# Patient Record
Sex: FEMALE | Race: WHITE | Employment: OTHER | URBAN - METROPOLITAN AREA
[De-identification: names, ages, dates, MRNs, and addresses within clinical notes are randomized per-mention and may not be internally consistent; named-entity substitution may affect disease eponyms.]

---

## 2017-03-20 ENCOUNTER — OFFICE VISIT (OUTPATIENT)
Dept: INTERNAL MEDICINE CLINIC | Age: 62
End: 2017-03-20

## 2017-03-20 VITALS
OXYGEN SATURATION: 98 % | BODY MASS INDEX: 26.15 KG/M2 | SYSTOLIC BLOOD PRESSURE: 122 MMHG | RESPIRATION RATE: 16 BRPM | HEIGHT: 60 IN | TEMPERATURE: 97.1 F | DIASTOLIC BLOOD PRESSURE: 80 MMHG | WEIGHT: 133.2 LBS | HEART RATE: 77 BPM

## 2017-03-20 DIAGNOSIS — Z12.39 BREAST CANCER SCREENING: ICD-10-CM

## 2017-03-20 DIAGNOSIS — J30.9 ALLERGIC RHINITIS, UNSPECIFIED ALLERGIC RHINITIS TRIGGER, UNSPECIFIED RHINITIS SEASONALITY: ICD-10-CM

## 2017-03-20 DIAGNOSIS — I10 BENIGN HYPERTENSION: Primary | ICD-10-CM

## 2017-03-20 DIAGNOSIS — E78.2 MIXED HYPERLIPIDEMIA: ICD-10-CM

## 2017-03-20 DIAGNOSIS — Z79.899 ENCOUNTER FOR LONG-TERM (CURRENT) DRUG USE: ICD-10-CM

## 2017-03-20 DIAGNOSIS — F32.A DEPRESSION, UNSPECIFIED DEPRESSION TYPE: ICD-10-CM

## 2017-03-20 PROBLEM — Z71.89 ADVANCE DIRECTIVE DISCUSSED WITH PATIENT: Status: ACTIVE | Noted: 2017-03-20

## 2017-03-20 NOTE — PROGRESS NOTES
Subjective:      Pedro Pablo Augustine is a 63 y.o. female who presents today for follow up of her hypertension, hyperlipidemia and depression. She has a very stressful job. She is going to retire in about 2 1/2 years. She is thinking of increasing her celexa from 10mg daily to 20mg daily. She walks a little daily. She denies any chest pain, shortness of breath, syncope, headaches, nausea/vomiting, or any bowel changes. She has increased drainage into her ears , left greater than right, mostly in the evening. Patient Active Problem List   Diagnosis Code    Essential hypertension I10    Migraine G43.909    Herpes zoster B02.9    S/P MARIKA-BSO Z90.710, Z90.722, Z90.79    Anxiety F41.9    Unspecified vitamin D deficiency E55.9    Hx of screening mammography Z92.89    Pap smear for cervical cancer screening Z12.4    Colon cancer screening Z12.11    Hyperlipidemia E78.5    Environmental allergies Z91.09     Current Outpatient Prescriptions   Medication Sig Dispense Refill    citalopram (CELEXA) 10 mg tablet Take 1 Tab by mouth daily. 90 Tab 1    rosuvastatin (CRESTOR) 5 mg tablet Take 1 Tab by mouth every other day. 45 Tab 1    valsartan (DIOVAN) 80 mg tablet Take 1 Tab by mouth daily. 90 Tab 1    estradiol (ESTRACE) 2 mg tablet Take 1 Tab by mouth every other day. 90 Tab 0    cyanocobalamin (VITAMIN B-12) 1,000 mcg tablet Take 1,000 mcg by mouth daily.  fluticasone (FLONASE) 50 mcg/actuation nasal spray 2 Sprays by Both Nostrils route daily. 3 Bottle 1    ascorbic acid (VITAMIN C) 500 mg tablet Take 500 mg by mouth daily.  vitamin c-vitamin e (CRANBERRY CONCENTRATE) cap Take 1 Tab by mouth daily.  lysine (L-LYSINE) 500 mg tab tablet Take 500 mg by mouth daily.  CETIRIZINE HCL (ZYRTEC PO) Take 10 mg by mouth daily.  cholecalciferol, vitamin d3, (VITAMIN D) 1,000 unit tablet Take 1,000 Units by mouth daily.  ASPIRIN 81 mg Tab Take 81 mg by mouth. Review of Systems    Pertinent items are noted in HPI. Objective: Wt Readings from Last 3 Encounters:   03/20/17 133 lb 3.2 oz (60.4 kg)   09/21/16 132 lb (59.9 kg)   05/19/16 131 lb 12.8 oz (59.8 kg)     Temp Readings from Last 3 Encounters:   03/20/17 97.1 °F (36.2 °C) (Oral)   09/21/16 97.8 °F (36.6 °C) (Oral)   05/19/16 97.8 °F (36.6 °C) (Oral)     BP Readings from Last 3 Encounters:   03/20/17 122/80   09/21/16 100/58   05/19/16 110/70     Pulse Readings from Last 3 Encounters:   03/20/17 77   09/21/16 74   05/19/16 60      Visit Vitals    /80 (BP 1 Location: Right arm, BP Patient Position: Sitting)    Pulse 77    Temp 97.1 °F (36.2 °C) (Oral)    Resp 16    Ht 5' (1.524 m)    Wt 133 lb 3.2 oz (60.4 kg)    SpO2 98%    BMI 26.01 kg/m2     General appearance: alert, cooperative, no distress, appears stated age  Head: Normocephalic, without obvious abnormality, atraumatic  Neck: supple, symmetrical, trachea midline, no adenopathy, no carotid bruit and no JVD  Lungs: clear to auscultation bilaterally  Heart: regular rate and rhythm, S1, S2 normal, no murmur, click, rub or gallop  Extremities: extremities normal, atraumatic, no cyanosis or edema  Pulses: 2+ and symmetric  Neurologic: Mental status: Alert, oriented, thought content appropriate, affect: normal    Assessment/Plan:     1. Benign hypertension  -I evaluated and recommended to continue current doses of medications. 2. Mixed hyperlipidemia  -compliant with use of crestor.  -last fasting lipid panel done 9/2016 and controlled     3. Depression, unspecified depression type  -has very stressful days at work. Will continue with celexa 10mg daily. I encouraged her to increase her exercise. 4. Allergic rhinitis, unspecified allergic rhinitis trigger, unspecified rhinitis seasonality  -recommended she use flonase daily in addition to her zyrtec    5. Encounter for long-term (current) drug use      6.  Breast cancer screening  -due for her screening mammogram.     - [facility] MAMMO BI SCREENING INCL CAD; Future     Orders Placed This Encounter    MIGUE MAMMO BI SCREENING INCL CAD     Standing Status:   Future     Standing Expiration Date:   9/18/2017     Order Specific Question:   Reason for Exam     Answer:   screening mammogram      Follow-up Disposition:     Follow up in 6 months     Return if symptoms worsen or fail to improve. Advised patient to call back or return to office if symptoms worsen/change/persist.     Discussed expected course/resolution/complications of diagnosis in detail with patient. Medication risks/benefits/costs/interactions/alternatives discussed with patient. Patient was given an after visit summary which includes diagnoses, current medications, & vitals. Patient expressed understanding with the diagnosis and plan.

## 2017-03-20 NOTE — MR AVS SNAPSHOT
Visit Information Date & Time Provider Department Dept. Phone Encounter #  
 3/20/2017  9:20 AM Aurelia Giraldo MD [place] [address] [facility] [phone] [id number] Follow-up Instructions Return in about 6 months (around 9/20/2017). Upcoming Health Maintenance Date Due  
 BREAST CANCER SCRN MAMMOGRAM 11/6/2017 DTaP/Tdap/Td series (2 - Td) 9/16/2025 COLONOSCOPY 4/29/2026 Allergies as of 3/20/2017  Review Complete On: 3/20/2017 By: Aurelia Giraldo MD  
  
 Severity Noted Reaction Type Reactions Ace Inhibitors  12/28/2011   Side Effect Cough Demerol [Meperidine]  02/19/2009    Nausea and Vomiting Lipitor [Atorvastatin]  08/13/2014   Side Effect Myalgia Monopril [Fosinopril]  02/19/2009    Other (comments)  
 fatigue Current Immunizations  Reviewed on 9/21/2016 Name Date Influenza Vaccine 10/28/2015, 10/1/2014, 10/23/2013 Influenza Vaccine (Quad) PF 9/21/2016 10:20 AM  
 Tdap 9/16/2015 Not reviewed this visit You Were Diagnosed With   
  
 Codes Comments Benign hypertension    -  Primary ICD-10-CM: I10 
ICD-9-CM: 401.1 Mixed hyperlipidemia     ICD-10-CM: E78.2 ICD-9-CM: 272.2 Depression, unspecified depression type     ICD-10-CM: F32.9 ICD-9-CM: 387 Allergic rhinitis, unspecified allergic rhinitis trigger, unspecified rhinitis seasonality     ICD-10-CM: J30.9 ICD-9-CM: 477.9 Encounter for long-term (current) drug use     ICD-10-CM: Z79.899 ICD-9-CM: V58.69 Vitals BP Pulse Temp Resp Height(growth percentile) Weight(growth percentile) 122/80 (BP 1 Location: Right arm, BP Patient Position: Sitting) 77 97.1 °F (36.2 °C) (Oral) 16 5' (1.524 m) 133 lb 3.2 oz (60.4 kg) SpO2 BMI OB Status Smoking Status 98% 26.01 kg/m2 Hysterectomy Never Smoker BMI and BSA Data Body Mass Index Body Surface Area 26.01 kg/m 2 1.6 m 2 Preferred Pharmacy Pharmacy Name Phone STACEY POSADAS [facility] [address] [place] [phone] Your Updated Medication List  
  
   
This list is accurate as of: 3/20/17  9:54 AM.  Always use your most recent med list.  
  
  
  
  
 aspirin 81 mg tablet Take 81 mg by mouth.  
  
 citalopram 10 mg tablet Commonly known as:  Delona Nestle Take 1 Tab by mouth daily. CRANBERRY CONCENTRATE Cap Generic drug:  vitamin c-vitamin e Take 1 Tab by mouth daily. estradiol 2 mg tablet Commonly known as:  ESTRACE Take 1 Tab by mouth every other day. fluticasone 50 mcg/actuation nasal spray Commonly known as:  Cathlyn Christian 2 Sprays by Both Nostrils route daily. lysine 500 mg Tab tablet Commonly known as:  L-LYSINE Take 500 mg by mouth daily. rosuvastatin 5 mg tablet Commonly known as:  CRESTOR Take 1 Tab by mouth every other day. valsartan 80 mg tablet Commonly known as:  DIOVAN Take 1 Tab by mouth daily. VITAMIN B-12 1,000 mcg tablet Generic drug:  cyanocobalamin Take 1,000 mcg by mouth daily. VITAMIN C 500 mg tablet Generic drug:  ascorbic acid (vitamin C) Take 500 mg by mouth daily. VITAMIN D3 1,000 unit tablet Generic drug:  cholecalciferol Take 1,000 Units by mouth daily. ZYRTEC PO Take 10 mg by mouth daily. Follow-up Instructions Return in about 6 months (around 9/20/2017). Introducing [facility] & HEALTH SERVICES! Dear Paty Hancock: Thank you for requesting a [employer] account. Our records indicate that you already have an active [employer] account. You can access your account anytime at [email or web address] Did you know that you can access your hospital and ER discharge instructions at any time in [employer]? You can also review all of your test results from your hospital stay or ER visit. Additional Information If you have questions, please visit the Frequently Asked Questions section of the [employer] website at [email or web address] Remember, [employer] is NOT to be used for urgent needs. For medical emergencies, dial 911. Now available from your iPhone and Android! Please provide this summary of care documentation to your next provider. Your primary care clinician is listed as Ivon Armendariz. If you have any questions after today's visit, please call [phone].

## 2017-04-10 RX ORDER — CITALOPRAM 10 MG/1
10 TABLET ORAL DAILY
Qty: 90 TAB | Refills: 1 | Status: SHIPPED | OUTPATIENT
Start: 2017-04-10 | End: 2017-10-08 | Stop reason: SDUPTHER

## 2017-04-10 RX ORDER — VALSARTAN 80 MG/1
80 TABLET ORAL DAILY
Qty: 90 TAB | Refills: 1 | Status: SHIPPED | OUTPATIENT
Start: 2017-04-10 | End: 2017-10-08 | Stop reason: SDUPTHER

## 2017-04-10 NOTE — TELEPHONE ENCOUNTER
From: Esther Dimas  To: JASEN Lanier  Sent: 4/10/2017 8:37 AM EDT  Subject: Medication Renewal Request    Original authorizing provider: JASEN Lanier would like a refill of the following medications:  citalopram (CELEXA) 10 mg tablet [JASEN Mar]  valsartan (DIOVAN) 80 mg tablet JASEN Lanier]    Preferred pharmacy: [facility] [place] [address] [address]     Comment:

## 2017-04-10 NOTE — TELEPHONE ENCOUNTER
Last office visit - 03.20.17  Next office visit - 09.25.17  Last Refill - 10.14.16    Requested Prescriptions     Pending Prescriptions Disp Refills    citalopram (CELEXA) 10 mg tablet 90 Tab 1     Sig: Take 1 Tab by mouth daily.  valsartan (DIOVAN) 80 mg tablet 90 Tab 1     Sig: Take 1 Tab by mouth daily.

## 2017-05-11 RX ORDER — ESTRADIOL 2 MG/1
2 TABLET ORAL EVERY OTHER DAY
Qty: 90 TAB | Refills: 1 | Status: SHIPPED | OUTPATIENT
Start: 2017-05-11 | End: 2018-09-04 | Stop reason: SDUPTHER

## 2017-05-11 RX ORDER — ROSUVASTATIN CALCIUM 5 MG/1
5 TABLET, COATED ORAL EVERY OTHER DAY
Qty: 45 TAB | Refills: 1 | Status: SHIPPED | OUTPATIENT
Start: 2017-05-11 | End: 2018-01-05 | Stop reason: SDUPTHER

## 2017-05-11 NOTE — TELEPHONE ENCOUNTER
From: Gregory Banks  To: Maicol Zhou MD  Sent: 5/11/2017 9:00 AM EDT  Subject: Medication Renewal Request    Original authorizing provider: MD Gregory Ivy would like a refill of the following medications:  estradiol (ESTRACE) 2 mg tablet Katina Valerio MD]    [facility] [address] [place] [address], [facility] [address].     Comment:

## 2017-05-11 NOTE — TELEPHONE ENCOUNTER
Last office visit - 03.20.17  Next office visit - 09.25.17    Requested Prescriptions     Pending Prescriptions Disp Refills    estradiol (ESTRACE) 2 mg tablet 90 Tab 0     Sig: Take 1 Tab by mouth every other day.  rosuvastatin (CRESTOR) 5 mg tablet 45 Tab 1     Sig: Take 1 Tab by mouth every other day.

## 2017-09-06 ENCOUNTER — HOSPITAL ENCOUNTER (OUTPATIENT)
Dept: MAMMOGRAPHY | Age: 62
Discharge: HOME | End: 2017-09-06
Attending: INTERNAL MEDICINE
Payer: COMMERCIAL

## 2017-09-06 DIAGNOSIS — Z12.31 VISIT FOR SCREENING MAMMOGRAM: ICD-10-CM

## 2017-09-06 PROCEDURE — 77063 BREAST TOMOSYNTHESIS BI: CPT

## 2017-09-25 ENCOUNTER — OFFICE VISIT (OUTPATIENT)
Dept: INTERNAL MEDICINE CLINIC | Age: 62
End: 2017-09-25

## 2017-09-25 VITALS
SYSTOLIC BLOOD PRESSURE: 118 MMHG | HEIGHT: 60 IN | DIASTOLIC BLOOD PRESSURE: 78 MMHG | BODY MASS INDEX: 25.52 KG/M2 | OXYGEN SATURATION: 97 % | RESPIRATION RATE: 16 BRPM | WEIGHT: 130 LBS | HEART RATE: 94 BPM | TEMPERATURE: 98.4 F

## 2017-09-25 DIAGNOSIS — Z00.00 ROUTINE GENERAL MEDICAL EXAMINATION AT A HEALTH CARE FACILITY: Primary | ICD-10-CM

## 2017-09-25 DIAGNOSIS — Z23 ENCOUNTER FOR IMMUNIZATION: ICD-10-CM

## 2017-09-25 DIAGNOSIS — F32.A DEPRESSION, UNSPECIFIED DEPRESSION TYPE: ICD-10-CM

## 2017-09-25 DIAGNOSIS — H91.93 BILATERAL HEARING LOSS, UNSPECIFIED HEARING LOSS TYPE: ICD-10-CM

## 2017-09-25 DIAGNOSIS — Z79.899 ENCOUNTER FOR LONG-TERM (CURRENT) USE OF MEDICATIONS: ICD-10-CM

## 2017-09-25 DIAGNOSIS — I10 BENIGN HYPERTENSION: ICD-10-CM

## 2017-09-25 DIAGNOSIS — E78.2 MIXED HYPERLIPIDEMIA: ICD-10-CM

## 2017-09-25 NOTE — MR AVS SNAPSHOT
Visit Information Date & Time Provider Department Dept. Phone Encounter #  
 9/25/2017 10:00 AM Kaela Bowser MD [place] [address] [facility] [phone] Follow-up Instructions Return in about 6 months (around 3/25/2018) for hypertension, hyperlipidemia, depression. Upcoming Health Maintenance Date Due INFLUENZA AGE 9 TO ADULT 8/1/2017 ZOSTER VACCINE AGE 60> 11/4/2017* BREAST CANCER SCRN MAMMOGRAM 9/6/2019 DTaP/Tdap/Td series (2 - Td) 9/16/2025 COLONOSCOPY 4/29/2026 *Topic was postponed. The date shown is not the original due date. Allergies as of 9/25/2017  Review Complete On: 9/25/2017 By: Kaela Bowser MD  
  
 Severity Noted Reaction Type Reactions Ace Inhibitors  12/28/2011   Side Effect Cough Demerol [Meperidine]  02/19/2009    Nausea and Vomiting Lipitor [Atorvastatin]  08/13/2014   Side Effect Myalgia Monopril [Fosinopril]  02/19/2009    Other (comments)  
 fatigue Current Immunizations  Reviewed on 9/21/2016 Name Date Influenza Vaccine 10/28/2015, 10/1/2014, 10/23/2013 Influenza Vaccine (Quad) PF  Incomplete, 9/21/2016 10:20 AM  
 Tdap 9/16/2015 Not reviewed this visit You Were Diagnosed With   
  
 Codes Comments Routine general medical examination at a health care facility    -  Primary ICD-10-CM: Z00.00 ICD-9-CM: V70.0 Encounter for immunization     ICD-10-CM: U86 ICD-9-CM: V03.89 Bilateral hearing loss, unspecified hearing loss type     ICD-10-CM: H91.93 
ICD-9-CM: 389.9 Mixed hyperlipidemia     ICD-10-CM: E78.2 ICD-9-CM: 272.2 Benign hypertension     ICD-10-CM: I10 
ICD-9-CM: 401.1 Depression, unspecified depression type     ICD-10-CM: F32.9 ICD-9-CM: 117 Encounter for long-term (current) use of medications     ICD-10-CM: Z79.899 ICD-9-CM: V58.69 Vitals BP Pulse Temp Resp Height(growth percentile) Weight(growth percentile) 118/78 (BP 1 Location: Left arm, BP Patient Position: Sitting) 94 98.4 °F (36.9 °C) (Oral) 16 5' (1.524 m) 130 lb (59 kg) SpO2 BMI OB Status Smoking Status 97% 25.39 kg/m2 Hysterectomy Never Smoker Vitals History BMI and BSA Data Body Mass Index Body Surface Area  
 25.39 kg/m 2 1.58 m 2 Preferred Pharmacy Pharmacy Name Phone [facility]  [address] [place] [address] [phone] Your Updated Medication List  
  
   
This list is accurate as of: 9/25/17 10:49 AM.  Always use your most recent med list.  
  
  
  
  
 aspirin 81 mg tablet Take 81 mg by mouth.  
  
 citalopram 10 mg tablet Commonly known as:  Radha Sink Take 1 Tab by mouth daily. CRANBERRY CONCENTRATE Cap Generic drug:  vitamin c-vitamin e Take 1 Tab by mouth daily. estradiol 2 mg tablet Commonly known as:  ESTRACE Take 1 Tab by mouth every other day. fluticasone 50 mcg/actuation nasal spray Commonly known as:  [employer] 2 Sprays by Both Nostrils route daily. lysine 500 mg Tab tablet Commonly known as:  L-LYSINE Take 500 mg by mouth daily. rosuvastatin 5 mg tablet Commonly known as:  CRESTOR Take 1 Tab by mouth every other day. valsartan 80 mg tablet Commonly known as:  DIOVAN Take 1 Tab by mouth daily. VITAMIN A DAY PO Take  by mouth. VITAMIN B-12 1,000 mcg tablet Generic drug:  cyanocobalamin Take 1,000 mcg by mouth daily. VITAMIN C 500 mg tablet Generic drug:  ascorbic acid (vitamin C) Take 500 mg by mouth daily. VITAMIN D3 1,000 unit tablet Generic drug:  cholecalciferol Take 1,000 Units by mouth daily. ZYRTEC PO Take 10 mg by mouth daily. We Performed the Following AMB POC EKG ROUTINE W/ 12 LEADS, INTER & REP [49086 CPT(R)] CBC WITH AUTOMATED DIFF [07978 CPT(R)] INFLUENZA VIRUS VAC QUAD,SPLIT,PRESV FREE SYRINGE IM [id number] CPT(R)] LIPID PANEL [89419 CPT(R)] METABOLIC PANEL, COMPREHENSIVE [54290 CPT(R)] OH IMMUNIZ ADMIN,1 SINGLE/COMB VAC/TOXOID [id number] CPT(R)] REFERRAL TO ENT-OTOLARYNGOLOGY [DUQ42 Custom] Comments:  
 Please evaluate patient for hearing testing. URINALYSIS W/ RFLX MICROSCOPIC [90987 CPT(R)] VITAMIN D, 25 HYDROXY [id number] CPT(R)] Follow-up Instructions Return in about 6 months (around 3/25/2018) for hypertension, hyperlipidemia, depression. Referral Information Referral ID Referred By Referred To  
  
 [id number] Williams SOLANO MD [facility] [address] [place] [address], [address] [place] [address] Phone: [phone] Fax: [phone] Visits Status Start Date End Date 1 New Request 9/25/17 9/25/18 If your referral has a status of pending review or denied, additional information will be sent to support the outcome of this decision. Introducing [facility] & HEALTH SERVICES! Dear Angel Childress: Thank you for requesting a [employer] account. Our records indicate that you already have an active [employer] account. You can access your account anytime at [email or web address] Did you know that you can access your hospital and ER discharge instructions at any time in [employer]? You can also review all of your test results from your hospital stay or ER visit. Additional Information If you have questions, please visit the Frequently Asked Questions section of the [employer] website at [email or web address] Remember, [employer] is NOT to be used for urgent needs. For medical emergencies, dial 911. Now available from your iPhone and Android! Please provide this summary of care documentation to your next provider. Your primary care clinician is listed as Ivon Solano. If you have any questions after today's visit, please call [phone].

## 2017-09-25 NOTE — PROGRESS NOTES
Subjective:      Jillian Toribio is a 63 y.o. female who presents today for her annual physical and follow up of her depression, hypertension and hyperlipidemia. Gyn care - has had marika/bso  Scre[facility] - date- 9/7/17  Screening colonoscopy was last completed 4/29/16- Follow up in 5 years. Exercise -  Nothing regular    Has been diagnosed with bilateral hearing loss in the past, but feels that it is getting worse. Would like to have hearing retested. Her depression is well controlled with current dose of celexa. Patient Active Problem List   Diagnosis Code    Essential hypertension I10    Migraine G43.909    Herpes zoster B02.9    S/P MARIKA-BSO Z90.710, Z90.722, Z90.79    Anxiety F41.9    Unspecified vitamin D deficiency E55.9    Hx of screening mammography Z92.89    Pap smear for cervical cancer screening Z12.4    Colon cancer screening Z12.11    Hyperlipidemia E78.5    Environmental allergies Z91.09    Advance directive discussed with patient Z71.89     Current Outpatient Prescriptions   Medication Sig Dispense Refill    MULTIVITAMIN (VITAMIN A DAY PO) Take  by mouth.  varicella zoster vacine live (ZOSTAVAX) 19,400 unit/0.65 mL susr injection 1 Vial by SubCUTAneous route once as needed for up to 1 dose. 0.65 mL 0    estradiol (ESTRACE) 2 mg tablet Take 1 Tab by mouth every other day. 90 Tab 1    rosuvastatin (CRESTOR) 5 mg tablet Take 1 Tab by mouth every other day. 45 Tab 1    citalopram (CELEXA) 10 mg tablet Take 1 Tab by mouth daily. 90 Tab 1    valsartan (DIOVAN) 80 mg tablet Take 1 Tab by mouth daily. 90 Tab 1    cyanocobalamin (VITAMIN B-12) 1,000 mcg tablet Take 1,000 mcg by mouth daily.  fluticasone (FLONASE) 50 mcg/actuation nasal spray 2 Sprays by Both Nostrils route daily. 3 Bottle 1    ascorbic acid (VITAMIN C) 500 mg tablet Take 500 mg by mouth daily.  vitamin c-vitamin e (CRANBERRY CONCENTRATE) cap Take 1 Tab by mouth daily.       lysine (L-LYSINE) 500 mg tab tablet Take 500 mg by mouth daily.  CETIRIZINE HCL (ZYRTEC PO) Take 10 mg by mouth daily.  cholecalciferol, vitamin d3, (VITAMIN D) 1,000 unit tablet Take 1,000 Units by mouth daily.  ASPIRIN 81 mg Tab Take 81 mg by mouth. Review of Systems    A comprehensive review of systems was negative except for that written in the HPI. Objective:     Visit Vitals    /78 (BP 1 Location: Left arm, BP Patient Position: Sitting)    Pulse 94    Temp 98.4 °F (36.9 °C) (Oral)    Resp 16    Ht 5' (1.524 m)    Wt 130 lb (59 kg)    SpO2 97%    BMI 25.39 kg/m2     General:  Alert, cooperative, no distress, appears stated age. Head:  Normocephalic, without obvious abnormality, atraumatic. Eyes:  Conjunctivae/corneas clear. PERRL, EOMs intact. Ears:  Normal TMs and external ear canals both ears. Nose: Nares normal. Septum midline. Mucosa normal. No drainage or sinus tenderness. Throat: Lips, mucosa, and tongue normal. Teeth and gums normal.   Neck: Supple, symmetrical, trachea midline, no adenopathy, thyroid: no enlargement/tenderness/nodules, no carotid bruit and no JVD. Back:   Symmetric, no curvature. ROM normal. No CVA tenderness. Lungs:   Clear to auscultation bilaterally. Chest wall:  No tenderness or deformity. Heart:  Regular rate and rhythm, S1, S2 normal, no murmur, click, rub or gallop. Breast Exam:  No tenderness, masses, or nipple abnormality. Abdomen:   Soft, non-tender. Bowel sounds normal. No masses,  No organomegaly. Extremities: Extremities normal, atraumatic, no cyanosis or edema. Pulses: 2+ and symmetric all extremities. Skin: Skin color, texture, turgor normal. No rashes or lesions. Lymph nodes: Cervical, supraclavicular, and axillary nodes normal.   Neurologic: CNII-XII intact. Normal strength, sensation and reflexes throughout. Assessment/Plan:     1.  Routine general medical examination at a [facility] care facility  -up to date with screening mammogram  -up to date with screening colonoscopy   -due for shingles vaccine. - script given to patient. - AMB POC EKG ROUTINE W/ 12 LEADS, INTER & REP  - CBC WITH AUTOMATED DIFF  - METABOLIC PANEL, COMPREHENSIVE  - LIPID PANEL  - URINALYSIS W/ RFLX MICROSCOPIC  - VITAMIN D, 25 HYDROXY    2. Encounter for immunization  -received her flu vaccine today without any complications    - Influenza virus vaccine (QUADRIVALENT PRES FREE SYRINGE) IM ([id number])  - MT IMMUNIZ ADMIN,1 SINGLE/COMB VAC/TOXOID    Also, she has additional complaints of the following --.     3. Bilateral hearing loss, unspecified hearing loss type  -referred to [facility] for hearing testing.     - REFERRAL TO ENT-OTOLARYNGOLOGY    4. Mixed hyperlipidemia  -due for fasting lipid panel at this time. 5. Benign hypertension  -I evaluated and recommended to continue current doses of medications. 6. Depression, unspecified depression type  -I evaluated and recommended to continue current doses of medications. 7. Encounter for long-term (current) use of medications    Orders Placed This Encounter    MT IMMUNIZ ADMIN,1 SINGLE/COMB VAC/TOXOID    Influenza virus vaccine (QUADRIVALENT PRES FREE SYRINGE) IM (98229)    CBC WITH AUTOMATED DIFF    METABOLIC PANEL, COMPREHENSIVE    LIPID PANEL    URINALYSIS W/ RFLX MICROSCOPIC    VITAMIN D, 25 HYDROXY    REFERRAL TO ENT-OTOLARYNGOLOGY     Referral Priority:   Routine     Referral Type:   Consultation     Referral Reason:   Specialty Services Required     Referred to Provider:   Manjit Duval MD     Requested Specialty:   Otolaryngology    AMB POC EKG ROUTINE W/ 12 LEADS, INTER & REP     Order Specific Question:   Reason for Exam:     Answer:   complete physical exam    MULTIVITAMIN (VITAMIN A DAY PO)     Sig: Take  by mouth.     varicella zoster vacine live (ZOSTAVAX) 19,400 unit/0.65 mL susr injection     Si Vial by SubCUTAneous route once as needed for up to 1 dose. Dispense:  0.65 mL     Refill:  0         Follow-up Disposition:     Follow up in 6 months     Return if symptoms worsen or fail to improve. Advised patient to call back or return to office if symptoms worsen/change/persist.     Discussed expected course/resolution/complications of diagnosis in detail with patient. Medication risks/benefits/costs/interactions/alternatives discussed with patient. Patient was given an after visit summary which includes diagnoses, current medications, & vitals. Patient expressed understanding with the diagnosis and plan.

## 2017-09-25 NOTE — PROGRESS NOTES
Chief Complaint   Patient presents with    Complete Physical     1. Have you been to the ER, urgent care clinic since your last visit? Hospitalized since your last visit? No    2. Have you seen or consulted any other health care providers outside of the [address] [place] [address] since your last visit? Include any pap smears or colon screening.  No

## 2017-09-26 LAB
25(OH)D3+25(OH)D2 SERPL-MCNC: 53.2 NG/ML (ref 30–100)
ALBUMIN SERPL-MCNC: 4.5 G/DL (ref 3.5–5.5)
ALBUMIN/GLOB SERPL: 1.6 {RATIO} (ref 1.2–2.2)
ALP SERPL-CCNC: 61 IU/L (ref 39–117)
ALT SERPL-CCNC: 16 IU/L (ref 0–32)
APPEARANCE UR: CLEAR
AST SERPL-CCNC: 21 IU/L (ref 0–40)
BASOPHILS # BLD AUTO: 0 X10E3/UL (ref 0–0.2)
BASOPHILS NFR BLD AUTO: 1 %
BILIRUB SERPL-MCNC: 0.4 MG/DL (ref 0–1.2)
BILIRUB UR QL STRIP: NEGATIVE
BUN SERPL-MCNC: 12 MG/DL (ref 6–24)
BUN/CREAT SERPL: 16 (ref 9–23)
CALCIUM SERPL-MCNC: 9 MG/DL (ref 8.7–10.2)
CHLORIDE SERPL-SCNC: 102 MMOL/L (ref 96–106)
CHOLEST SERPL-MCNC: 152 MG/DL (ref 100–199)
CO2 SERPL-SCNC: 25 MMOL/L (ref 18–29)
COLOR UR: YELLOW
CREAT SERPL-MCNC: 0.77 MG/DL (ref 0.57–1)
EOSINOPHIL # BLD AUTO: 0.1 X10E3/UL (ref 0–0.4)
EOSINOPHIL NFR BLD AUTO: 3 %
ERYTHROCYTE [DISTWIDTH] IN BLOOD BY AUTOMATED COUNT: 14.4 % (ref 12.3–15.4)
GLOBULIN SER CALC-MCNC: 2.8 G/DL (ref 1.5–4.5)
GLUCOSE SERPL-MCNC: 86 MG/DL (ref 65–99)
GLUCOSE UR QL: NEGATIVE
HCT VFR BLD AUTO: 36.5 % (ref 34–46.6)
HDLC SERPL-MCNC: 79 MG/DL
HGB BLD-MCNC: 11.9 G/DL (ref 11.1–15.9)
HGB UR QL STRIP: NEGATIVE
IMM GRANULOCYTES # BLD: 0 X10E3/UL (ref 0–0.1)
IMM GRANULOCYTES NFR BLD: 0 %
INTERPRETATION, 910389: NORMAL
KETONES UR QL STRIP: NEGATIVE
LDLC SERPL CALC-MCNC: 57 MG/DL (ref 0–99)
LEUKOCYTE ESTERASE UR QL STRIP: NEGATIVE
LYMPHOCYTES # BLD AUTO: 1.3 X10E3/UL (ref 0.7–3.1)
LYMPHOCYTES NFR BLD AUTO: 41 %
MCH RBC QN AUTO: 30.8 PG (ref 26.6–33)
MCHC RBC AUTO-ENTMCNC: 32.6 G/DL (ref 31.5–35.7)
MCV RBC AUTO: 95 FL (ref 79–97)
MICRO URNS: NORMAL
MONOCYTES # BLD AUTO: 0.5 X10E3/UL (ref 0.1–0.9)
MONOCYTES NFR BLD AUTO: 15 %
NEUTROPHILS # BLD AUTO: 1.3 X10E3/UL (ref 1.4–7)
NEUTROPHILS NFR BLD AUTO: 40 %
NITRITE UR QL STRIP: NEGATIVE
PH UR STRIP: 6 [PH] (ref 5–7.5)
PLATELET # BLD AUTO: 233 X10E3/UL (ref 150–379)
POTASSIUM SERPL-SCNC: 3.6 MMOL/L (ref 3.5–5.2)
PROT SERPL-MCNC: 7.3 G/DL (ref 6–8.5)
PROT UR QL STRIP: NEGATIVE
RBC # BLD AUTO: 3.86 X10E6/UL (ref 3.77–5.28)
SODIUM SERPL-SCNC: 143 MMOL/L (ref 134–144)
SP GR UR: 1.02 (ref 1–1.03)
TRIGL SERPL-MCNC: 80 MG/DL (ref 0–149)
UROBILINOGEN UR STRIP-MCNC: 0.2 MG/DL (ref 0.2–1)
VLDLC SERPL CALC-MCNC: 16 MG/DL (ref 5–40)
WBC # BLD AUTO: 3.2 X10E3/UL (ref 3.4–10.8)

## 2018-01-05 RX ORDER — ROSUVASTATIN CALCIUM 5 MG/1
5 TABLET, COATED ORAL EVERY OTHER DAY
Qty: 45 TAB | Refills: 1 | Status: SHIPPED | OUTPATIENT
Start: 2018-01-05 | End: 2018-09-04 | Stop reason: SDUPTHER

## 2018-01-05 NOTE — TELEPHONE ENCOUNTER
Last office visit- 9/25/17  Next office visit- 3/27/18  Last Refill- 5/11/17    Requested Prescriptions     Pending Prescriptions Disp Refills    rosuvastatin (CRESTOR) 5 mg tablet 45 Tab 1     Sig: Take 1 Tab by mouth every other day.

## 2018-01-05 NOTE — TELEPHONE ENCOUNTER
From: Edward Schlatter  To: Radha Aceves MD  Sent: 1/5/2018 12:07 AM EST  Subject: Medication Renewal Request    Original authorizing provider: MD La Zhou would like a refill of the following medications:  rosuvastatin (CRESTOR) 5 mg tablet Yamilex Naidu MD]    Preferred pharmacy: [facility] [address] [place] [address], [address] [place] [address]. Comment:  Please request refill of Crestor from [address] [place] [address] (current pharmacy). Thanks!

## 2018-03-27 ENCOUNTER — OFFICE VISIT (OUTPATIENT)
Dept: INTERNAL MEDICINE CLINIC | Age: 63
End: 2018-03-27

## 2018-03-27 VITALS
SYSTOLIC BLOOD PRESSURE: 124 MMHG | WEIGHT: 131 LBS | HEIGHT: 60 IN | BODY MASS INDEX: 25.72 KG/M2 | RESPIRATION RATE: 14 BRPM | DIASTOLIC BLOOD PRESSURE: 76 MMHG | TEMPERATURE: 99.6 F | HEART RATE: 98 BPM | OXYGEN SATURATION: 98 %

## 2018-03-27 DIAGNOSIS — E78.2 MIXED HYPERLIPIDEMIA: Primary | ICD-10-CM

## 2018-03-27 DIAGNOSIS — Z79.899 ENCOUNTER FOR LONG-TERM (CURRENT) USE OF MEDICATIONS: ICD-10-CM

## 2018-03-27 DIAGNOSIS — H91.93 BILATERAL HEARING LOSS, UNSPECIFIED HEARING LOSS TYPE: ICD-10-CM

## 2018-03-27 DIAGNOSIS — R42 LIGHTHEADED: ICD-10-CM

## 2018-03-27 DIAGNOSIS — I10 BENIGN HYPERTENSION: ICD-10-CM

## 2018-03-27 DIAGNOSIS — S29.012A STRAIN OF RHOMBOID MUSCLE, INITIAL ENCOUNTER: ICD-10-CM

## 2018-03-27 DIAGNOSIS — F32.A DEPRESSION, UNSPECIFIED DEPRESSION TYPE: ICD-10-CM

## 2018-03-27 RX ORDER — CITALOPRAM 10 MG/1
10 TABLET ORAL DAILY
Qty: 90 TAB | Refills: 1 | Status: SHIPPED | OUTPATIENT
Start: 2018-03-27 | End: 2018-10-05 | Stop reason: SDUPTHER

## 2018-03-27 RX ORDER — VALSARTAN 80 MG/1
80 TABLET ORAL DAILY
Qty: 90 TAB | Refills: 1 | Status: SHIPPED | OUTPATIENT
Start: 2018-03-27 | End: 2018-10-05 | Stop reason: SDUPTHER

## 2018-03-27 NOTE — PROGRESS NOTES
Subjective:      Mel Lancaster is a 63 y.o. female who presents today for follow up of her depression, hypertension  And hyperlipidemia. Has some lightheadedness in the evening. Started about 4 months ago and has gotten worse. She is lightheaded not vertigo. Has headaches in her neck, top of her head at the end of the day as well. Feels like a vice squeezing. She works on a computer all day. No regular exercise. She states that it improves on the weekends, but recurs Monday late afternoon. She is consuming 3-4 soda cans daily. Meclizine otc helps a little. Last eye exam was last year. Due for follow up at this time. She was told in the past that she has bilateral hearing loss. It is genetic. She would like further evaluation with ENT    Blood pressures at home range systolic from 985'C to 675'V. Patient Active Problem List   Diagnosis Code    Essential hypertension I10    Migraine G43.909    Herpes zoster B02.9    S/P MARIKA-BSO Z90.710, Z90.722, Z90.79    Anxiety F41.9    Unspecified vitamin D deficiency E55.9    Hx of screening mammography Z92.89    Pap smear for cervical cancer screening Z12.4    Colon cancer screening Z12.11    Hyperlipidemia E78.5    Environmental allergies Z91.09    Advance directive discussed with patient Z71.89     Current Outpatient Prescriptions   Medication Sig Dispense Refill    varicella-zoster recombinant, PF, (SHINGRIX) 50 mcg/0.5 mL susr injection 0.5 mL by IntraMUSCular route once for 1 dose. Repeat in 2-4 months 0.5 mL 1    citalopram (CELEXA) 10 mg tablet Take 1 Tab by mouth daily. 90 Tab 1    valsartan (DIOVAN) 80 mg tablet Take 1 Tab by mouth daily. 90 Tab 1    rosuvastatin (CRESTOR) 5 mg tablet Take 1 Tab by mouth every other day. 45 Tab 1    estradiol (ESTRACE) 2 mg tablet Take 1 Tab by mouth every other day. 90 Tab 1    cyanocobalamin (VITAMIN B-12) 1,000 mcg tablet Take 1,000 mcg by mouth daily.       ascorbic acid (VITAMIN C) 500 mg tablet Take 500 mg by mouth daily.  vitamin c-vitamin e (CRANBERRY CONCENTRATE) cap Take 1 Tab by mouth daily.  CETIRIZINE HCL (ZYRTEC PO) Take 10 mg by mouth daily.  cholecalciferol, vitamin d3, (VITAMIN D) 1,000 unit tablet Take 2,000 Units by mouth daily.  fluticasone (FLONASE) 50 mcg/actuation nasal spray 2 Sprays by Both Nostrils route daily. 3 Bottle 1    lysine (L-LYSINE) 500 mg tab tablet Take 500 mg by mouth daily. Review of Systems    Pertinent items are noted in HPI. Objective:     Visit Vitals    /76 (BP 1 Location: Left arm, BP Patient Position: Sitting)    Pulse 98    Temp 99.6 °F (37.6 °C) (Oral)    Resp 14    Ht 5' (1.524 m)    Wt 131 lb (59.4 kg)    SpO2 98%    BMI 25.58 kg/m2     General appearance: alert, cooperative, no distress, appears stated age  Head: Normocephalic, without obvious abnormality, atraumatic  Ears: normal TM's and external ear canals AU  Nose: Nares normal. Septum midline. Mucosa dry No drainage or sinus tenderness. Throat: Lips, mucosa, and tongue normal. Teeth and gums normal and normal findings: oropharynx pink & moist without lesions or evidence of thrush  Neck: supple, symmetrical, trachea midline, no adenopathy, no carotid bruit and no JVD  Lungs: clear to auscultation bilaterally  Heart: regular rate and rhythm, S1, S2 normal, no murmur, click, rub or gallop  Spine: no tenderness to palpation along cervical spine. Significant muscle tightness in the upper trapezius region. Full range of motion at neck. Assessment/Plan:     1. Lightheaded  -encouraged reduction of caffeine use  -increase water intake  -will check labs  -recommended she follow up with her eye exams.     - CBC WITH AUTOMATED DIFF  - METABOLIC PANEL, COMPREHENSIVE  - TSH 3RD GENERATION    2. Mixed hyperlipidemia  -last fasting lipid panel done 9/2017 controlled. - METABOLIC PANEL, COMPREHENSIVE    3.  Benign hypertension  -I evaluated and recommended to continue current doses of medications.     - METABOLIC PANEL, COMPREHENSIVE    4. Depression, unspecified depression type  -I evaluated and recommended to continue current doses of medications. -patient requested refill of her celexa today    5. Encounter for long-term (current) use of medications      6. Bilateral hearing loss, unspecified hearing loss type  -referred to [facility] for hearing evaluation    - REFERRAL TO ENT-OTOLARYNGOLOGY    7. Strain of rhomboid muscle, initial encounter  -she works on a computer all day.  -encouraged stretches throughout the day      Follow-up Disposition:     Follow up in 6 months     Return if symptoms worsen or fail to improve. Advised patient to call back or return to office if symptoms worsen/change/persist.     Discussed expected course/resolution/complications of diagnosis in detail with patient. Medication risks/benefits/costs/interactions/alternatives discussed with patient. Patient was given an after visit summary which includes diagnoses, current medications, & vitals. Patient expressed understanding with the diagnosis and plan.

## 2018-03-27 NOTE — PATIENT INSTRUCTIONS
Rhomboid Muscle Strain: Rehab Exercises  Your Care Instructions  Here are some examples of typical rehabilitation exercises for your condition. Start each exercise slowly. Ease off the exercise if you start to have pain. Your doctor or physical therapist will tell you when you can start these exercises and which ones will work best for you. How to do the exercises  Lower neck and upper back (rhomboid) stretch    1. Stretch your arms out in front of your body. Clasp one hand on top of your other hand. 2. Gently reach out so that you feel your shoulder blades stretching away from each other. 3. Gently bend your head forward. 4. Hold for 15 to 30 seconds. 5. Repeat 2 to 4 times. Resisted rows    For this exercise, you will need elastic exercise material, such as surgical tubing or Thera-Band. 1. Put the band around a solid object, such as a bedpost, at about waist level. Stand facing where you have placed the band. Hold equal lengths of the band in each hand. 2. Start with your arms held out in front of you. 3. Pull the bands back, and move your shoulder blades together. As you finish, your elbows should be at your side and bent at 90 degrees (like the angle of the letter \"L\"). 4. Return to the starting position. 5. Repeat 8 to 12 times. Neck stretches    1. Look straight ahead, and tip your right ear to your right shoulder. Do not let your left shoulder rise as you tip your head to the right. 2. Hold for 15 to 30 seconds. 3. Tilt your head to the left. Do not let your right shoulder rise as you tip your head to the left. 4. Hold for 15 to 30 seconds. 5. Repeat 2 to 4 times to each side. Neck rotation    1. Sit in a firm chair, or stand up straight. 2. Keeping your chin level, turn your head to the right, and hold for 15 to 30 seconds. 3. Turn your head to the left, and hold for 15 to 30 seconds. 4. Repeat 2 to 4 times to each side. Follow-up care is a key part of your treatment and safety. Be sure to make and go to all appointments, and call your doctor if you are having problems. It's also a good idea to know your test results and keep a list of the medicines you take. Where can you learn more? Go to http://bennie-eboni.info/. Enter [id number] [id number] in the search box to learn more about \"Rhomboid Muscle Strain: Rehab Exercises. \"  Current as of: March 21, 2017  Content Version: 11.4  © 9732-3216 Healthwise, [email or web address]. Care instructions adapted under license by [employer] (which disclaims liability or warranty for this information). If you have questions about a medical condition or this instruction, always ask your healthcare professional. [address] any warranty or liability for your use of this information.

## 2018-03-27 NOTE — MR AVS SNAPSHOT
[address] [facility], [address] [place] [address] 
[phone] Patient: Tresa Rodriguez MRN:  [id number] Visit Information Date & Time Provider Department Dept. Phone Encounter #  
 3/27/2018 10:40 AM Afua Tamayo MD [place] [id number] [facility] [phone] Follow-up Instructions Return in about 6 months (around 9/27/2018) for hypertension, hyperlipidemia, depression. Upcoming Health Maintenance Date Due ZOSTER VACCINE AGE 60> 7/2/2018* BREAST CANCER SCRN MAMMOGRAM 9/6/2019 DTaP/Tdap/Td series (2 - Td) 9/16/2025 COLONOSCOPY 4/29/2026 *Topic was postponed. The date shown is not the original due date. Allergies as of 3/27/2018  Review Complete On: 3/27/2018 By: Afua Tamayo MD  
  
 Severity Noted Reaction Type Reactions Ace Inhibitors  12/28/2011   Side Effect Cough Demerol [Meperidine]  02/19/2009    Nausea and Vomiting Lipitor [Atorvastatin]  08/13/2014   Side Effect Myalgia Monopril [Fosinopril]  02/19/2009    Other (comments)  
 fatigue Current Immunizations  Reviewed on 9/25/2017 Name Date Influenza Vaccine 10/28/2015, 10/1/2014, 10/23/2013 Influenza Vaccine (Quad) PF 9/25/2017, 9/21/2016 10:20 AM  
 Tdap 9/16/2015 Not reviewed this visit You Were Diagnosed With   
  
 Codes Comments Mixed hyperlipidemia    -  Primary ICD-10-CM: X76.1 ICD-9-CM: 272.2 Lightheaded     ICD-10-CM: J40 ICD-9-CM: 780.4 Benign hypertension     ICD-10-CM: I10 
ICD-9-CM: 401.1 Depression, unspecified depression type     ICD-10-CM: F32.9 ICD-9-CM: 885 Encounter for long-term (current) use of medications     ICD-10-CM: Z79.899 ICD-9-CM: V58.69 Bilateral hearing loss, unspecified hearing loss type     ICD-10-CM: H91.93 
ICD-9-CM: 389.9 Strain of rhomboid muscle, initial encounter     ICD-10-CM: S29.012A ICD-9-CM: 847.1 Vitals BP Pulse Temp Resp Height(growth percentile) Weight(growth percentile) 124/76 (BP 1 Location: Left arm, BP Patient Position: Sitting) 98 99.6 °F (37.6 °C) (Oral) 14 5' (1.524 m) 131 lb (59.4 kg) SpO2 BMI OB Status Smoking Status 98% 25.58 kg/m2 Hysterectomy Never Smoker Vitals History BMI and BSA Data Body Mass Index Body Surface Area 25.58 kg/m 2 1.59 m 2 Preferred Pharmacy Pharmacy Name Phone [facility] [phone] Your Updated Medication List  
  
   
This list is accurate as of 3/27/18 11:12 AM.  Always use your most recent med list.  
  
  
  
  
 citalopram 10 mg tablet Commonly known as:  Sirisha Seen Take 1 Tab by mouth daily. CRANBERRY CONCENTRATE Cap Generic drug:  vitamin c-vitamin e Take 1 Tab by mouth daily. estradiol 2 mg tablet Commonly known as:  ESTRACE Take 1 Tab by mouth every other day. fluticasone 50 mcg/actuation nasal spray Commonly known as:  [place] Speller 2 Sprays by Both Nostrils route daily. lysine 500 mg Tab tablet Commonly known as:  L-LYSINE Take 500 mg by mouth daily. rosuvastatin 5 mg tablet Commonly known as:  CRESTOR Take 1 Tab by mouth every other day. valsartan 80 mg tablet Commonly known as:  DIOVAN Take 1 Tab by mouth daily. varicella-zoster recombinant (PF) 50 mcg/0.5 mL Susr injection Commonly known as:  SHINGRIX  
0.5 mL by IntraMUSCular route once for 1 dose. Repeat in 2-4 months VITAMIN B-12 1,000 mcg tablet Generic drug:  cyanocobalamin Take 1,000 mcg by mouth daily. VITAMIN C 500 mg tablet Generic drug:  ascorbic acid (vitamin C) Take 500 mg by mouth daily. VITAMIN D3 1,000 unit tablet Generic drug:  cholecalciferol Take 2,000 Units by mouth daily. ZYRTEC PO Take 10 mg by mouth daily. Prescriptions Printed Refills varicella-zoster recombinant, PF, (SHINGRIX) 50 mcg/0.5 mL susr injection 1 Si.5 mL by IntraMUSCular route once for 1 dose. Repeat in 2-4 months Class: Print Route: IntraMUSCular Prescriptions Sent to Pharmacy Refills  
 citalopram (CELEXA) 10 mg tablet 1 Sig: Take 1 Tab by mouth daily. Class: Normal  
 Pharmacy: [address], [address] [facility] Ph #: [phone] Route: Oral  
 valsartan (DIOVAN) 80 mg tablet 1 Sig: Take 1 Tab by mouth daily. Class: Normal  
 Pharmacy: [facility] [address], [address] [facility] Ph #: [phone] Route: Oral  
  
We Performed the Following CBC WITH AUTOMATED DIFF [10192 CPT(R)] METABOLIC PANEL, COMPREHENSIVE [32418 CPT(R)] REFERRAL TO ENT-OTOLARYNGOLOGY [WLL77 Custom] TSH 3RD GENERATION [10502 CPT(R)] Follow-up Instructions Return in about 6 months (around 2018) for hypertension, hyperlipidemia, depression. Referral Information Referral ID Referred By Referred To  
  
 [id number] Williams SOLANO MD [address] [facility], [address] [place] [address] Phone: [phone] Fax: [phone] Visits Status Start Date End Date 1 New Request 3/27/18 3/27/19 If your referral has a status of pending review or denied, additional information will be sent to support the outcome of this decision. Patient Instructions Rhomboid Muscle Strain: Rehab Exercises Your Care Instructions Here are some examples of typical rehabilitation exercises for your condition. Start each exercise slowly. Ease off the exercise if you start to have pain. Your doctor or physical therapist will tell you when you can start these exercises and which ones will work best for you. How to do the exercises Lower neck and upper back (rhomboid) stretch 1. Stretch your arms out in front of your body. Clasp one hand on top of your other hand. 2. Gently reach out so that you feel your shoulder blades stretching away from each other. 3. Gently bend your head forward. 4. Hold for 15 to 30 seconds. 5. Repeat 2 to 4 times. Resisted rows For this exercise, you will need elastic exercise material, such as surgical tubing or Thera-Band. 1. Put the band around a solid object, such as a bedpost, at about waist level. Stand facing where you have placed the band. Hold equal lengths of the band in each hand. 2. Start with your arms held out in front of you. 3. Pull the bands back, and move your shoulder blades together. As you finish, your elbows should be at your side and bent at 90 degrees (like the angle of the letter \"L\"). 4. Return to the starting position. 5. Repeat 8 to 12 times. Neck stretches 1. Look straight ahead, and tip your right ear to your right shoulder. Do not let your left shoulder rise as you tip your head to the right. 2. Hold for 15 to 30 seconds. 3. Tilt your head to the left. Do not let your right shoulder rise as you tip your head to the left. 4. Hold for 15 to 30 seconds. 5. Repeat 2 to 4 times to each side. Neck rotation 1. Sit in a firm chair, or stand up straight. 2. Keeping your chin level, turn your head to the right, and hold for 15 to 30 seconds. 3. Turn your head to the left, and hold for 15 to 30 seconds. 4. Repeat 2 to 4 times to each side. Follow-up care is a key part of your treatment and safety. Be sure to make and go to all appointments, and call your doctor if you are having problems. It's also a good idea to know your test results and keep a list of the medicines you take. Where can you learn more? Go to http://bennie-eboni.info/. Enter [phone] [id number] [phone] in the search box to learn more about \"Rhomboid Muscle Strain: Rehab Exercises. \" Current as of: March 21, 2017 Content Version: 11.4 © 9300-9367 Healthwise, Inc[employer]. Care instructions adapted under license by [employer] (which disclaims liability or warranty for this information). If you have questions about a medical condition or this instruction, always ask your healthcare professional. [address] any warranty or liability for your use of this information. Introducing [facility]! Dear Babita Jenkins: Thank you for requesting a CyberSponse account. Our records indicate that you already have an active CyberSponse account. You can access your account anytime at [email or web address] [employer]oter [email or web address] Did you know that you can access your hospital and ER discharge instructions at any time in CyberSponse? You can also review all of your test results from your hospital stay or ER visit. Additional Information If you have questions, please visit the Frequently Asked Questions section of the CyberSponse website at [email or web address] Remember, CyberSponse is NOT to be used for urgent needs. For medical emergencies, dial 911. Now available from your iPhone and Android! Please provide this summary of care documentation to your next provider. Your primary care clinician is listed as Ivon Armendariz. If you have any questions after today's visit, please call [phone].

## 2018-03-27 NOTE — PROGRESS NOTES
Reviewed record in preparation for visit and have obtained necessary documentation. Identified pt with two pt identifiers(name and ). Health Maintenance Due   Topic    ZOSTER VACCINE AGE 60>          Chief Complaint   Patient presents with    Hypertension     6 month f/u    Cholesterol Problem    Depression    Advance Care Planning     Pt states that she has a completed [facility] Advance Directive at home and will provide a copy at next office visit. Wt Readings from Last 3 Encounters:   18 131 lb (59.4 kg)   17 130 lb (59 kg)   17 133 lb 3.2 oz (60.4 kg)     Temp Readings from Last 3 Encounters:   18 99.6 °F (37.6 °C) (Oral)   17 98.4 °F (36.9 °C) (Oral)   17 97.1 °F (36.2 °C) (Oral)     BP Readings from Last 3 Encounters:   18 124/76   17 118/78   17 122/80     Pulse Readings from Last 3 Encounters:   18 98   17 94   17 77           Learning Assessment:  :     Learning Assessment 3/27/2018 2014 2013   PRIMARY LEARNER Patient Patient Patient   HIGHEST LEVEL OF EDUCATION - PRIMARY LEARNER  4 YEARS OF COLLEGE 4 YEARS OF COLLEGE 4 YEARS OF COLLEGE   BARRIERS PRIMARY LEARNER NONE NONE NONE   CO-LEARNER CAREGIVER No No -   PRIMARY LANGUAGE ENGLISH ENGLISH ENGLISH    NEED - No No   LEARNER PREFERENCE PRIMARY READING LISTENING READING   LEARNING SPECIAL TOPICS - no -   ANSWERED BY self patient patient   RELATIONSHIP SELF SELF SELF       Depression Screening:  :     PHQ over the last two weeks 3/27/2018   Little interest or pleasure in doing things Not at all   Feeling down, depressed or hopeless Not at all   Total Score PHQ 2 0       Fall Risk Assessment:  :     Fall Risk Assessment, last 12 mths 3/27/2018   Able to walk? Yes   Fall in past 12 months? Yes   Fall with injury?  No   Number of falls in past 12 months 1   Fall Risk Score 1       Abuse Screening:  :     Abuse Screening Questionnaire 3/27/2018 2017 2/18/2015 2/25/2014   Do you ever feel afraid of your partner? N N N N   Are you in a relationship with someone who physically or mentally threatens you? N N N N   Is it safe for you to go home? Y Y Y Y       Coordination of Care Questionnaire:  :     1) Have you been to an emergency room, urgent care clinic since your last visit? no   Hospitalized since your last visit? no             2) Have you seen or consulted any other health care providers outside of [address] [place] [address] since your last visit? no  (Include any pap smears or colon screenings in this section.)    3) Do you have an Advance Directive on file? no    4) Are you interested in receiving information on Advance Directives? Pt will provide completed copy at next office visit.

## 2018-03-28 LAB
ALBUMIN SERPL-MCNC: 4.6 G/DL (ref 3.6–4.8)
ALBUMIN/GLOB SERPL: 1.5 {RATIO} (ref 1.2–2.2)
ALP SERPL-CCNC: 66 IU/L (ref 39–117)
ALT SERPL-CCNC: 21 IU/L (ref 0–32)
AST SERPL-CCNC: 23 IU/L (ref 0–40)
BASOPHILS # BLD AUTO: 0.1 X10E3/UL (ref 0–0.2)
BASOPHILS NFR BLD AUTO: 1 %
BILIRUB SERPL-MCNC: 0.3 MG/DL (ref 0–1.2)
BUN SERPL-MCNC: 14 MG/DL (ref 8–27)
BUN/CREAT SERPL: 20 (ref 12–28)
CALCIUM SERPL-MCNC: 9.5 MG/DL (ref 8.7–10.3)
CHLORIDE SERPL-SCNC: 101 MMOL/L (ref 96–106)
CO2 SERPL-SCNC: 26 MMOL/L (ref 18–29)
CREAT SERPL-MCNC: 0.7 MG/DL (ref 0.57–1)
EOSINOPHIL # BLD AUTO: 0.1 X10E3/UL (ref 0–0.4)
EOSINOPHIL NFR BLD AUTO: 3 %
ERYTHROCYTE [DISTWIDTH] IN BLOOD BY AUTOMATED COUNT: 14.2 % (ref 12.3–15.4)
GFR SERPLBLD CREATININE-BSD FMLA CKD-EPI: 109 ML/MIN/1.73
GFR SERPLBLD CREATININE-BSD FMLA CKD-EPI: 94 ML/MIN/1.73
GLOBULIN SER CALC-MCNC: 3 G/DL (ref 1.5–4.5)
GLUCOSE SERPL-MCNC: 88 MG/DL (ref 65–99)
HCT VFR BLD AUTO: 35.6 % (ref 34–46.6)
HGB BLD-MCNC: 12.2 G/DL (ref 11.1–15.9)
IMM GRANULOCYTES # BLD: 0 X10E3/UL (ref 0–0.1)
IMM GRANULOCYTES NFR BLD: 0 %
LYMPHOCYTES # BLD AUTO: 1.3 X10E3/UL (ref 0.7–3.1)
LYMPHOCYTES NFR BLD AUTO: 35 %
MCH RBC QN AUTO: 31.2 PG (ref 26.6–33)
MCHC RBC AUTO-ENTMCNC: 34.3 G/DL (ref 31.5–35.7)
MCV RBC AUTO: 91 FL (ref 79–97)
MONOCYTES # BLD AUTO: 0.4 X10E3/UL (ref 0.1–0.9)
MONOCYTES NFR BLD AUTO: 11 %
NEUTROPHILS # BLD AUTO: 1.8 X10E3/UL (ref 1.4–7)
NEUTROPHILS NFR BLD AUTO: 50 %
PLATELET # BLD AUTO: 243 X10E3/UL (ref 150–379)
POTASSIUM SERPL-SCNC: 4 MMOL/L (ref 3.5–5.2)
PROT SERPL-MCNC: 7.6 G/DL (ref 6–8.5)
RBC # BLD AUTO: 3.91 X10E6/UL (ref 3.77–5.28)
SODIUM SERPL-SCNC: 143 MMOL/L (ref 134–144)
TSH SERPL DL<=0.005 MIU/L-ACNC: 2.52 UIU/ML (ref 0.45–4.5)
WBC # BLD AUTO: 3.6 X10E3/UL (ref 3.4–10.8)

## 2018-09-05 RX ORDER — ESTRADIOL 2 MG/1
2 TABLET ORAL EVERY OTHER DAY
Qty: 90 TAB | Refills: 0 | Status: SHIPPED | OUTPATIENT
Start: 2018-09-05 | End: 2019-05-19 | Stop reason: SDUPTHER

## 2018-09-05 RX ORDER — ROSUVASTATIN CALCIUM 5 MG/1
5 TABLET, COATED ORAL EVERY OTHER DAY
Qty: 45 TAB | Refills: 0 | Status: SHIPPED | OUTPATIENT
Start: 2018-09-05 | End: 2019-01-23 | Stop reason: SDUPTHER

## 2018-09-05 NOTE — TELEPHONE ENCOUNTER
From: Nidia Copeland  To: Bouchra Dhaliwal MD  Sent: 9/4/2018 7:46 PM EDT  Subject: Medication Renewal Request    Original authorizing provider: Bouchra Dhaliwal MD    [address] [place] [address] would like a refill of the following medications:  estradiol (ESTRACE) 2 mg tablet Janeen Sanchez MD]  rosuvastatin (CRESTOR) 5 mg tablet Janeen Sanchez MD]    Preferred pharmacy: [facility] [address] [place] [address]. Comment:  Please refill at [facility]. Thank you!

## 2018-10-05 RX ORDER — CITALOPRAM 10 MG/1
10 TABLET ORAL DAILY
Qty: 90 TAB | Refills: 0 | Status: SHIPPED | OUTPATIENT
Start: 2018-10-05 | End: 2019-01-08 | Stop reason: SDUPTHER

## 2018-10-05 RX ORDER — IRBESARTAN 75 MG/1
75 TABLET ORAL
Qty: 30 TAB | Refills: 0 | Status: SHIPPED | OUTPATIENT
Start: 2018-10-05 | End: 2018-10-30 | Stop reason: SDUPTHER

## 2018-10-05 RX ORDER — VALSARTAN 80 MG/1
80 TABLET ORAL DAILY
Qty: 30 TAB | Refills: 0 | Status: SHIPPED | OUTPATIENT
Start: 2018-10-05 | End: 2018-10-05 | Stop reason: ALTCHOICE

## 2018-10-05 NOTE — TELEPHONE ENCOUNTER
From: Raisa Abernathy  To: Jacob Zamarripa MD  Sent: 10/5/2018 12:26 AM EDT  Subject: Medication Renewal Request    Original authorizing provider: Jacob Zamarripa MD    [address] [place] [address] would like a refill of the following medications:  citalopram (CELEXA) 10 mg tablet Mirta Buck MD]  valsartan (DIOVAN) 80 mg tablet Mirta Buck MD]    Preferred pharmacy: [facility] [address] [place] [address]. Comment:  Please refill the celaxa & diovan prescriptions noted above. If possible, can the prescription for the Diovan (Valsartan) be refilled for only a one month supply since I need to discuss this prescription with Dr. Ilia Corea on my upcoming appointment in October. The current prescription that I have will run out before the appointment. A 30 day supply will be fine for the DIOVAN. The Celexa can be refilled as is done currently. Thank you!

## 2018-10-05 NOTE — TELEPHONE ENCOUNTER
[employer] called wanting to know what alternative to valsartan  wanted to prescribe for the patient because of the recall. Please call janice back at [phone]

## 2018-10-05 NOTE — TELEPHONE ENCOUNTER
Rx Irbesartan 75mg pended for provider review to replace Rx valsartan 80mg that has been recalled. Requested Prescriptions     Pending Prescriptions Disp Refills    irbesartan (AVAPRO) 75 mg tablet 30 Tab 0     Sig: Take 1 Tab by mouth nightly.

## 2018-10-30 ENCOUNTER — OFFICE VISIT (OUTPATIENT)
Dept: INTERNAL MEDICINE CLINIC | Age: 63
End: 2018-10-30

## 2018-10-30 VITALS
RESPIRATION RATE: 14 BRPM | HEART RATE: 82 BPM | SYSTOLIC BLOOD PRESSURE: 112 MMHG | HEIGHT: 60 IN | BODY MASS INDEX: 25.13 KG/M2 | WEIGHT: 128 LBS | TEMPERATURE: 96.8 F | OXYGEN SATURATION: 97 % | DIASTOLIC BLOOD PRESSURE: 68 MMHG

## 2018-10-30 DIAGNOSIS — M54.32 BILATERAL SCIATICA: ICD-10-CM

## 2018-10-30 DIAGNOSIS — I10 BENIGN HYPERTENSION: ICD-10-CM

## 2018-10-30 DIAGNOSIS — Z79.899 ENCOUNTER FOR LONG-TERM (CURRENT) USE OF MEDICATIONS: ICD-10-CM

## 2018-10-30 DIAGNOSIS — F32.A DEPRESSION, UNSPECIFIED DEPRESSION TYPE: ICD-10-CM

## 2018-10-30 DIAGNOSIS — M54.31 BILATERAL SCIATICA: ICD-10-CM

## 2018-10-30 DIAGNOSIS — Z00.00 ROUTINE GENERAL MEDICAL EXAMINATION AT A HEALTH CARE FACILITY: Primary | ICD-10-CM

## 2018-10-30 DIAGNOSIS — Z23 ENCOUNTER FOR IMMUNIZATION: ICD-10-CM

## 2018-10-30 DIAGNOSIS — E78.2 MIXED HYPERLIPIDEMIA: ICD-10-CM

## 2018-10-30 RX ORDER — MECLIZINE HYDROCHLORIDE 25 MG/1
25 TABLET ORAL AS NEEDED
COMMUNITY
Start: 2018-08-08

## 2018-10-30 RX ORDER — IRBESARTAN 75 MG/1
75 TABLET ORAL
Qty: 30 TAB | Refills: 0 | Status: SHIPPED | OUTPATIENT
Start: 2018-10-30 | End: 2018-12-05 | Stop reason: SDUPTHER

## 2018-10-30 RX ORDER — CYCLOBENZAPRINE HCL 10 MG
10 TABLET ORAL AS NEEDED
COMMUNITY
Start: 2018-10-08 | End: 2020-01-28 | Stop reason: ALTCHOICE
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
## 2018-10-30 NOTE — PATIENT INSTRUCTIONS
Vaccine Information Statement Influenza (Flu) Vaccine (Inactivated or Recombinant): What you need to know Many Vaccine Information Statements are available in [place] and other languages. See www.immunize.org/vis Hojas de Información Sobre Vacunas están disponibles en Español y en muchos otros idiomas. Visite www.immunize.org/vis 1. Why get vaccinated? Influenza (flu) is a contagious disease that spreads around the [place] every year, usually between October and May. Flu is caused by influenza viruses, and is spread mainly by coughing, sneezing, and close contact. Anyone can get flu. Flu strikes suddenly and can last several days. Symptoms vary by age, but can include: 
 fever/chills  sore throat  muscle aches  fatigue  cough  headache  runny or stuffy nose Flu can also lead to pneumonia and blood infections, and cause diarrhea and seizures in children. If you have a medical condition, such as heart or lung disease, flu can make it worse. Flu is more dangerous for some people. Infants and young children, people 72years of age and older, pregnant women, and people with certain health conditions or a weakened immune system are at greatest risk. Each year thousands of people in the [facility] die from flu, and many more are hospitalized. Flu vaccine can: 
 keep you from getting flu, 
 make flu less severe if you do get it, and 
 keep you from spreading flu to your family and other people. 2. Inactivated and recombinant flu vaccines A dose of flu vaccine is recommended every flu season. Children 6 months through 6years of age may need two doses during the same flu season. Everyone else needs only one dose each flu season.   
 
 
Some inactivated flu vaccines contain a very small amount of a mercury-based preservative called thimerosal. Studies have not shown thimerosal in vaccines to be harmful, but flu vaccines that do not contain thimerosal are available. There is no live flu virus in flu shots. They cannot cause the flu. There are many flu viruses, and they are always changing. Each year a new flu vaccine is made to protect against three or four viruses that are likely to cause disease in the upcoming flu season. But even when the vaccine doesnt exactly match these viruses, it may still provide some protection Flu vaccine cannot prevent: 
 flu that is caused by a virus not covered by the vaccine, or 
 illnesses that look like flu but are not. It takes about 2 weeks for protection to develop after vaccination, and protection lasts through the flu season. 3. Some people should not get this vaccine Tell the person who is giving you the vaccine:  If you have any severe, life-threatening allergies. If you ever had a life-threatening allergic reaction after a dose of flu vaccine, or have a severe allergy to any part of this vaccine, you may be advised not to get vaccinated. Most, but not all, types of flu vaccine contain a small amount of egg protein.  If you ever had Guillain-Barré Syndrome (also called GBS). Some people with a history of GBS should not get this vaccine. This should be discussed with your doctor.  If you are not feeling well. It is usually okay to get flu vaccine when you have a mild illness, but you might be asked to come back when you feel better. 4. Risks of a vaccine reaction With any medicine, including vaccines, there is a chance of reactions. These are usually mild and go away on their own, but serious reactions are also possible. Most people who get a flu shot do not have any problems with it. Minor problems following a flu shot include:  
 soreness, redness, or swelling where the shot was given  hoarseness  sore, red or itchy eyes  cough  fever  aches  headache  itching  fatigue If these problems occur, they usually begin soon after the shot and last 1 or 2 days. More serious problems following a flu shot can include the following:  There may be a small increased risk of Guillain-Barré Syndrome (GBS) after inactivated flu vaccine. This risk has been estimated at 1 or 2 additional cases per million people vaccinated. This is much lower than the risk of severe complications from flu, which can be prevented by flu vaccine.  Young children who get the flu shot along with pneumococcal vaccine (PCV13) and/or DTaP vaccine at the same time might be slightly more likely to have a seizure caused by fever. Ask your doctor for more information. Tell your doctor if a child who is getting flu vaccine has ever had a seizure. Problems that could happen after any injected vaccine:  People sometimes faint after a medical procedure, including vaccination. Sitting or lying down for about 15 minutes can help prevent fainting, and injuries caused by a fall. Tell your doctor if you feel dizzy, or have vision changes or ringing in the ears.  Some people get severe pain in the shoulder and have difficulty moving the arm where a shot was given. This happens very rarely.  Any medication can cause a severe allergic reaction. Such reactions from a vaccine are very rare, estimated at about 1 in a million doses, and would happen within a few minutes to a few hours after the vaccination. As with any medicine, there is a very remote chance of a vaccine causing a serious injury or death. The safety of vaccines is always being monitored. For more information, visit: www.cdc.gov/vaccinesafety/ 
 
5. What if there is a serious reaction? What should I look for?  Look for anything that concerns you, such as signs of a severe allergic reaction, very high fever, or unusual behavior.  
 
Signs of a severe allergic reaction can include hives, swelling of the face and throat, difficulty breathing, a fast heartbeat, dizziness, and weakness  usually within a few minutes to a few hours after the vaccination. What should I do?  If you think it is a severe allergic reaction or other emergency that cant wait, call 9-1-1 and get the person to the nearest hospital. Otherwise, call your doctor.  Reactions should be reported to the Vaccine Adverse Event Reporting System (VAERS). Your doctor should file this report, or you can do it yourself through  the VAERS web site at www.vaers. [facility].gov, or by calling 5-381.955.6038. VAERS does not give medical advice. 6. The National Vaccine Injury Compensation Program 
 
The [facility] Vaccine Injury Compensation Program (VICP) is a federal program that was created to compensate people who may have been injured by certain vaccines. Persons who believe they may have been injured by a vaccine can learn about the program and about filing a claim by calling 7-276.254.4774 or visiting the [facility] [employer] website at www.[facility].gov/vaccinecompensation. There is a time limit to file a claim for compensation. 7. How can I learn more?  Ask your healthcare provider. He or she can give you the vaccine package insert or suggest other sources of information.  Call your local or state health department.  Contact the Centers for Disease Control and Prevention (CDC): 
- Call 7-114.477.6434 (1-800-CDC-INFO) or 
- Visit CDCs website at www.cdc.gov/flu Vaccine Information Statement Inactivated Influenza Vaccine 8/7/2015 
42 [address] [id number] Department of Health and [place] Centers for Disease Control and Prevention Office Use Only

## 2018-10-31 LAB
ALBUMIN SERPL-MCNC: 4.7 G/DL (ref 3.6–4.8)
ALBUMIN/GLOB SERPL: 1.9 {RATIO} (ref 1.2–2.2)
ALP SERPL-CCNC: 52 IU/L (ref 39–117)
ALT SERPL-CCNC: 21 IU/L (ref 0–32)
APPEARANCE UR: CLEAR
AST SERPL-CCNC: 22 IU/L (ref 0–40)
BACTERIA #/AREA URNS HPF: NORMAL /[HPF]
BASOPHILS # BLD AUTO: 0 X10E3/UL (ref 0–0.2)
BASOPHILS NFR BLD AUTO: 1 %
BILIRUB SERPL-MCNC: 0.4 MG/DL (ref 0–1.2)
BILIRUB UR QL STRIP: NEGATIVE
BUN SERPL-MCNC: 16 MG/DL (ref 8–27)
BUN/CREAT SERPL: 22 (ref 12–28)
CALCIUM SERPL-MCNC: 9 MG/DL (ref 8.7–10.3)
CASTS URNS QL MICRO: NORMAL /LPF
CHLORIDE SERPL-SCNC: 103 MMOL/L (ref 96–106)
CHOLEST SERPL-MCNC: 170 MG/DL (ref 100–199)
CO2 SERPL-SCNC: 24 MMOL/L (ref 20–29)
COLOR UR: YELLOW
CREAT SERPL-MCNC: 0.72 MG/DL (ref 0.57–1)
EOSINOPHIL # BLD AUTO: 0.2 X10E3/UL (ref 0–0.4)
EOSINOPHIL NFR BLD AUTO: 4 %
EPI CELLS #/AREA URNS HPF: NORMAL /HPF
ERYTHROCYTE [DISTWIDTH] IN BLOOD BY AUTOMATED COUNT: 13.7 % (ref 12.3–15.4)
GLOBULIN SER CALC-MCNC: 2.5 G/DL (ref 1.5–4.5)
GLUCOSE SERPL-MCNC: 94 MG/DL (ref 65–99)
GLUCOSE UR QL: NEGATIVE
HCT VFR BLD AUTO: 33.5 % (ref 34–46.6)
HDLC SERPL-MCNC: 78 MG/DL
HGB BLD-MCNC: 11.5 G/DL (ref 11.1–15.9)
HGB UR QL STRIP: NEGATIVE
IMM GRANULOCYTES # BLD: 0 X10E3/UL (ref 0–0.1)
IMM GRANULOCYTES NFR BLD: 0 %
INTERPRETATION, 910389: NORMAL
KETONES UR QL STRIP: NEGATIVE
LDLC SERPL CALC-MCNC: 77 MG/DL (ref 0–99)
LEUKOCYTE ESTERASE UR QL STRIP: NEGATIVE
LYMPHOCYTES # BLD AUTO: 1.7 X10E3/UL (ref 0.7–3.1)
LYMPHOCYTES NFR BLD AUTO: 41 %
MCH RBC QN AUTO: 31.1 PG (ref 26.6–33)
MCHC RBC AUTO-ENTMCNC: 34.3 G/DL (ref 31.5–35.7)
MCV RBC AUTO: 91 FL (ref 79–97)
MICRO URNS: NORMAL
MICRO URNS: NORMAL
MONOCYTES # BLD AUTO: 0.5 X10E3/UL (ref 0.1–0.9)
MONOCYTES NFR BLD AUTO: 12 %
MUCOUS THREADS URNS QL MICRO: PRESENT
NEUTROPHILS # BLD AUTO: 1.7 X10E3/UL (ref 1.4–7)
NEUTROPHILS NFR BLD AUTO: 42 %
NITRITE UR QL STRIP: NEGATIVE
PH UR STRIP: 5.5 [PH] (ref 5–7.5)
PLATELET # BLD AUTO: 251 X10E3/UL (ref 150–379)
POTASSIUM SERPL-SCNC: 3.7 MMOL/L (ref 3.5–5.2)
PROT SERPL-MCNC: 7.2 G/DL (ref 6–8.5)
PROT UR QL STRIP: NEGATIVE
RBC # BLD AUTO: 3.7 X10E6/UL (ref 3.77–5.28)
RBC #/AREA URNS HPF: NORMAL /HPF
SODIUM SERPL-SCNC: 141 MMOL/L (ref 134–144)
SP GR UR: 1.03 (ref 1–1.03)
TRIGL SERPL-MCNC: 75 MG/DL (ref 0–149)
URINALYSIS REFLEX, 377202: NORMAL
UROBILINOGEN UR STRIP-MCNC: 0.2 MG/DL (ref 0.2–1)
VLDLC SERPL CALC-MCNC: 15 MG/DL (ref 5–40)
WBC # BLD AUTO: 4 X10E3/UL (ref 3.4–10.8)
WBC #/AREA URNS HPF: NORMAL /HPF

## 2018-12-06 RX ORDER — IRBESARTAN 75 MG/1
75 TABLET ORAL
Qty: 90 TAB | Refills: 1 | Status: SHIPPED | OUTPATIENT
Start: 2018-12-06 | End: 2019-06-03 | Stop reason: SDUPTHER

## 2019-01-08 RX ORDER — CITALOPRAM 10 MG/1
10 TABLET ORAL DAILY
Qty: 90 TAB | Refills: 1 | Status: SHIPPED | OUTPATIENT
Start: 2019-01-08 | End: 2019-07-10 | Stop reason: SDUPTHER

## 2019-01-08 NOTE — TELEPHONE ENCOUNTER
Last refill- 10.05.18  Last office visit - 10/30/2018  Next office visit -   Future Appointments   Date Time Provider Hebert Sarahy   5/1/2019  9:20 AM MD MEE Knott         Requested Prescriptions     Pending Prescriptions Disp Refills    citalopram (CELEXA) 10 mg tablet 90 Tab 0     Sig: Take 1 Tab by mouth daily.

## 2019-01-24 RX ORDER — ROSUVASTATIN CALCIUM 5 MG/1
5 TABLET, COATED ORAL EVERY OTHER DAY
Qty: 45 TAB | Refills: 1 | Status: SHIPPED | OUTPATIENT
Start: 2019-01-24 | End: 2019-07-10 | Stop reason: SDUPTHER

## 2019-02-04 ENCOUNTER — HOSPITAL ENCOUNTER (OUTPATIENT)
Dept: MAMMOGRAPHY | Age: 64
Discharge: HOME | End: 2019-02-04
Attending: INTERNAL MEDICINE
Payer: COMMERCIAL

## 2019-02-04 DIAGNOSIS — Z12.39 BREAST SCREENING: ICD-10-CM

## 2019-02-04 PROCEDURE — 77063 BREAST TOMOSYNTHESIS BI: CPT

## 2019-05-01 ENCOUNTER — OFFICE VISIT (OUTPATIENT)
Dept: INTERNAL MEDICINE CLINIC | Age: 64
End: 2019-05-01

## 2019-05-01 VITALS
DIASTOLIC BLOOD PRESSURE: 78 MMHG | BODY MASS INDEX: 25.25 KG/M2 | HEIGHT: 60 IN | SYSTOLIC BLOOD PRESSURE: 100 MMHG | OXYGEN SATURATION: 97 % | RESPIRATION RATE: 18 BRPM | HEART RATE: 76 BPM | TEMPERATURE: 98 F | WEIGHT: 128.6 LBS

## 2019-05-01 DIAGNOSIS — M21.619 BUNION: ICD-10-CM

## 2019-05-01 DIAGNOSIS — F32.A DEPRESSION, UNSPECIFIED DEPRESSION TYPE: ICD-10-CM

## 2019-05-01 DIAGNOSIS — I10 BENIGN HYPERTENSION: ICD-10-CM

## 2019-05-01 DIAGNOSIS — E78.2 MIXED HYPERLIPIDEMIA: Primary | ICD-10-CM

## 2019-05-01 DIAGNOSIS — Z79.899 ENCOUNTER FOR LONG-TERM (CURRENT) USE OF MEDICATIONS: ICD-10-CM

## 2019-05-01 NOTE — PROGRESS NOTES
Reviewed record in preparation for visit and have obtained necessary documentation. Identified pt with two pt identifiers(name and ). There are no preventive care reminders to display for this patient. Chief Complaint Patient presents with  Depression 6 mth f/u  Hypertension 6 mth  Cholesterol Problem 6 mth f/u Wt Readings from Last 3 Encounters:  
19 128 lb 9.6 oz (58.3 kg) 10/30/18 128 lb (58.1 kg) 18 131 lb (59.4 kg) Temp Readings from Last 3 Encounters:  
10/30/18 96.8 °F (36 °C) (Oral) 18 99.6 °F (37.6 °C) (Oral) 17 98.4 °F (36.9 °C) (Oral) BP Readings from Last 3 Encounters:  
10/30/18 112/68  
18 124/76  
17 118/78 Pulse Readings from Last 3 Encounters:  
10/30/18 82  
18 98  
17 94 Learning Assessment: 
:  
 
Learning Assessment 2019 3/27/2018 2014 2013 PRIMARY LEARNER Patient Patient Patient Patient HIGHEST LEVEL OF EDUCATION - PRIMARY LEARNER  4 YEARS OF COLLEGE 4 YEARS OF COLLEGE 4 YEARS OF COLLEGE 4 YEARS OF COLLEGE  
BARRIERS PRIMARY LEARNER NONE NONE NONE NONE  
CO-LEARNER CAREGIVER - No No - PRIMARY LANGUAGE ENGLISH ENGLISH ENGLISH ENGLISH  NEED - - No No  
LEARNER PREFERENCE PRIMARY READING READING LISTENING READING  
LEARNING SPECIAL TOPICS - - no -  
ANSWERED BY patient self patient patient RELATIONSHIP SELF SELF SELF SELF Depression Screening: 
:  
 
3 most recent PHQ Screens 2019 Little interest or pleasure in doing things Not at all Feeling down, depressed, irritable, or hopeless Not at all Total Score PHQ 2 0 Fall Risk Assessment: 
:  
 
Fall Risk Assessment, last 12 mths 2019 Able to walk? Yes Fall in past 12 months? No  
Fall with injury? -  
Number of falls in past 12 months - Fall Risk Score -  
 
 
Abuse Screening: 
:  
 
Abuse Screening Questionnaire 2019 3/27/2018 2017 2015 2014 Do you ever feel afraid of your partner? N N N N N Are you in a relationship with someone who physically or mentally threatens you? N N N N N Is it safe for you to go home? Karlene Cotton Coordination of Care Questionnaire: 
:  
 
1) Have you been to an emergency room, urgent care clinic since your last visit? yes Urgent Care 3/2019 DX: Sinus Infection Hospitalized since your last visit? no          
 
2) Have you seen or consulted any other health care providers outside of [address] [place] [address] since your last visit? no Patient had Mammogram 3/.2019  (Include any pap smears or colon screenings in this section.) 3) Do you have an Advance Directive on file? no 
 
4) Are you interested in receiving information on Advance Directives? NO Patient is accompanied by self I have received verbal consent from Nessa Justin to discuss any/all medical information while they are present in the room.

## 2019-05-01 NOTE — PROGRESS NOTES
Subjective:  
  
Juan M Lopez is a 66 y.o. female who presents today for follow up of her hypertension, hyperlipidemia and depression. Patient Active Problem List  
Diagnosis Code  Essential hypertension I10  Migraine G43.909  Herpes zoster B02.9  S/P MARIKA-BSO Z90.710, Z90.722, Z90.79  
 Anxiety F41.9  Unspecified vitamin D deficiency E55.9  
 Hx of screening mammography Z92.89  
 Pap smear for cervical cancer screening Z12.4  
 Colon cancer screening Z12.11  
 Hyperlipidemia E78.5  Environmental allergies Z91.09  
 Advance directive discussed with patient Z71.89  
 Mild depression ([address] F32.0 Current Outpatient Medications Medication Sig Dispense Refill  rosuvastatin (CRESTOR) 5 mg tablet Take 1 Tab by mouth every other day. 45 Tab 1  citalopram (CELEXA) 10 mg tablet Take 1 Tab by mouth daily. 90 Tab 1  
 irbesartan (AVAPRO) 75 mg tablet Take 1 Tab by mouth nightly. 90 Tab 1  
 meclizine (ANTIVERT) 25 mg tablet Take 25 mg by mouth as needed.  estradiol (ESTRACE) 2 mg tablet Take 1 Tab by mouth every other day. 90 Tab 0  
 cyanocobalamin (VITAMIN B-12) 1,000 mcg tablet Take 1,000 mcg by mouth daily.  fluticasone (FLONASE) 50 mcg/actuation nasal spray 2 Sprays by Both Nostrils route daily. (Patient taking differently: 2 Sprays by Both Nostrils route daily as needed.) 3 Bottle 1  
 ascorbic acid (VITAMIN C) 500 mg tablet Take 500 mg by mouth daily.  vitamin c-vitamin e (CRANBERRY CONCENTRATE) cap Take 1 Tab by mouth daily.  lysine (L-LYSINE) 500 mg tab tablet Take 500 mg by mouth daily.  CETIRIZINE HCL (ZYRTEC PO) Take 10 mg by mouth daily.  cholecalciferol, vitamin d3, (VITAMIN D) 1,000 unit tablet Take 2,000 Units by mouth daily.  cyclobenzaprine (FLEXERIL) 10 mg tablet Take 10 mg by mouth as needed. Review of Systems Pertinent items are noted in HPI. Objective:  
 
Visit Vitals /78 (BP 1 Location: Left arm, BP Patient Position: Sitting) Pulse 76 Temp 98 °F (36.7 °C) (Oral) Resp 18 Ht 4' 9.5\" (1.461 m) Wt 128 lb 9.6 oz (58.3 kg) SpO2 97% BMI 27.35 kg/m² General appearance: alert, cooperative, no distress, appears stated age Head: Normocephalic, without obvious abnormality, atraumatic Neck: supple, symmetrical, trachea midline, no adenopathy, no carotid bruit and no JVD Lungs: clear to auscultation bilaterally Heart: regular rate and rhythm, S1, S2 normal, no murmur, click, rub or gallop Extremities: extremities normal, atraumatic, no cyanosis or edema Pulses: 2+ and symmetric Assessment/Plan: 1. Mixed hyperlipidemia 
-last fasting lipid panel done 10/2018 controlled  
 
- CBC WITH AUTOMATED DIFF 
- METABOLIC PANEL, COMPREHENSIVE 2. Benign hypertension 
-I evaluated and recommended to continue current doses of medications. - CBC WITH AUTOMATED DIFF 
- METABOLIC PANEL, COMPREHENSIVE 3. Depression, unspecified depression type 
-I evaluated and recommended to continue current doses of medications. 4. Encounter for long-term (current) use of medications 
 
- CBC WITH AUTOMATED DIFF 
- METABOLIC PANEL, COMPREHENSIVE 
- REFERRAL TO ORTHODONTICS 5. Bunion 
-referred patient to Dr. Arti Mclean for correction.  
 
- REFERRAL TO ORTHODONTICS Orders Placed This Encounter  CBC WITH AUTOMATED DIFF  
 METABOLIC PANEL, COMPREHENSIVE  
 REFERRAL TO ORTHODONTICS Referral Priority:   Routine Referral Type:   Consultation Referral Reason:   Specialty Services Required Referred to Provider:   Senia Brown MD  
  Number of Visits Requested:   1 Follow-up Disposition:  
 
Follow up in 6 months Return if symptoms worsen or fail to improve. Advised patient to call back or return to office if symptoms worsen/change/persist.  
 
Discussed expected course/resolution/complications of diagnosis in detail with patient. Medication risks/benefits/costs/interactions/alternatives discussed with patient. Patient was given an after visit summary which includes diagnoses, current medications, & vitals. Patient expressed understanding with the diagnosis and plan.

## 2019-05-02 LAB
ALBUMIN SERPL-MCNC: 4.6 G/DL (ref 3.6–4.8)
ALBUMIN/GLOB SERPL: 1.7 {RATIO} (ref 1.2–2.2)
ALP SERPL-CCNC: 62 IU/L (ref 39–117)
ALT SERPL-CCNC: 22 IU/L (ref 0–32)
AST SERPL-CCNC: 22 IU/L (ref 0–40)
BASOPHILS # BLD AUTO: 0.1 X10E3/UL (ref 0–0.2)
BASOPHILS NFR BLD AUTO: 2 %
BILIRUB SERPL-MCNC: 0.5 MG/DL (ref 0–1.2)
BUN SERPL-MCNC: 15 MG/DL (ref 8–27)
BUN/CREAT SERPL: 20 (ref 12–28)
CALCIUM SERPL-MCNC: 9.3 MG/DL (ref 8.7–10.3)
CHLORIDE SERPL-SCNC: 102 MMOL/L (ref 96–106)
CO2 SERPL-SCNC: 25 MMOL/L (ref 20–29)
CREAT SERPL-MCNC: 0.76 MG/DL (ref 0.57–1)
EOSINOPHIL # BLD AUTO: 0.2 X10E3/UL (ref 0–0.4)
EOSINOPHIL NFR BLD AUTO: 4 %
ERYTHROCYTE [DISTWIDTH] IN BLOOD BY AUTOMATED COUNT: 13.7 % (ref 12.3–15.4)
GLOBULIN SER CALC-MCNC: 2.7 G/DL (ref 1.5–4.5)
GLUCOSE SERPL-MCNC: 87 MG/DL (ref 65–99)
HCT VFR BLD AUTO: 34.6 % (ref 34–46.6)
HGB BLD-MCNC: 11.6 G/DL (ref 11.1–15.9)
IMM GRANULOCYTES # BLD AUTO: 0 X10E3/UL (ref 0–0.1)
IMM GRANULOCYTES NFR BLD AUTO: 0 %
LYMPHOCYTES # BLD AUTO: 1.6 X10E3/UL (ref 0.7–3.1)
LYMPHOCYTES NFR BLD AUTO: 41 %
MCH RBC QN AUTO: 30.4 PG (ref 26.6–33)
MCHC RBC AUTO-ENTMCNC: 33.5 G/DL (ref 31.5–35.7)
MCV RBC AUTO: 91 FL (ref 79–97)
MONOCYTES # BLD AUTO: 0.5 X10E3/UL (ref 0.1–0.9)
MONOCYTES NFR BLD AUTO: 12 %
NEUTROPHILS # BLD AUTO: 1.6 X10E3/UL (ref 1.4–7)
NEUTROPHILS NFR BLD AUTO: 41 %
PLATELET # BLD AUTO: 267 X10E3/UL (ref 150–379)
POTASSIUM SERPL-SCNC: 4.3 MMOL/L (ref 3.5–5.2)
PROT SERPL-MCNC: 7.3 G/DL (ref 6–8.5)
RBC # BLD AUTO: 3.81 X10E6/UL (ref 3.77–5.28)
SODIUM SERPL-SCNC: 142 MMOL/L (ref 134–144)
WBC # BLD AUTO: 3.9 X10E3/UL (ref 3.4–10.8)

## 2019-05-20 RX ORDER — ESTRADIOL 2 MG/1
2 TABLET ORAL EVERY OTHER DAY
Qty: 90 TAB | Refills: 0 | Status: SHIPPED | OUTPATIENT
Start: 2019-05-20 | End: 2019-10-31 | Stop reason: SDUPTHER

## 2019-05-20 NOTE — TELEPHONE ENCOUNTER
Last refill- 9/5/18  Last office visit - 5/1/2019  Next office visit -   Future Appointments   Date Time Provider Hebert Weathers   11/5/2019 10:00 AM MD MEE Trejo         Requested Prescriptions     Pending Prescriptions Disp Refills    estradiol (ESTRACE) 2 mg tablet 90 Tab 0     Sig: Take 1 Tab by mouth every other day.

## 2019-06-03 RX ORDER — IRBESARTAN 75 MG/1
75 TABLET ORAL
Qty: 90 TAB | Refills: 1 | Status: SHIPPED | OUTPATIENT
Start: 2019-06-03 | End: 2019-11-22 | Stop reason: SDUPTHER

## 2019-06-03 NOTE — TELEPHONE ENCOUNTER
Last refill- 12.06.18  Last office visit - 5/1/2019  Next office visit -   Future Appointments   Date Time Provider Hebert Sarahy   11/5/2019 10:00 AM MD MEE Bowles         Requested Prescriptions     Pending Prescriptions Disp Refills    irbesartan (AVAPRO) 75 mg tablet 90 Tab 1     Sig: Take 1 Tab by mouth nightly.

## 2019-07-11 RX ORDER — CITALOPRAM 10 MG/1
10 TABLET ORAL DAILY
Qty: 90 TAB | Refills: 1 | Status: SHIPPED | OUTPATIENT
Start: 2019-07-11 | End: 2020-01-07

## 2019-07-11 RX ORDER — ROSUVASTATIN CALCIUM 5 MG/1
5 TABLET, COATED ORAL EVERY OTHER DAY
Qty: 45 TAB | Refills: 1 | Status: SHIPPED | OUTPATIENT
Start: 2019-07-11 | End: 2019-07-22 | Stop reason: SDUPTHER

## 2019-07-11 NOTE — TELEPHONE ENCOUNTER
Last refill- 1/8/19  Last office visit - 5/1/2019  Next office visit -   Future Appointments   Date Time Provider Hebert Dianei   11/5/2019 10:00 AM MD MEE Powell         Requested Prescriptions     Pending Prescriptions Disp Refills    citalopram (CELEXA) 10 mg tablet 90 Tab 1     Sig: Take 1 Tab by mouth daily.  rosuvastatin (CRESTOR) 5 mg tablet 45 Tab 1     Sig: Take 1 Tab by mouth every other day.

## 2019-10-31 RX ORDER — ESTRADIOL 2 MG/1
TABLET ORAL
Qty: 30 TAB | Refills: 1 | Status: SHIPPED | OUTPATIENT
Start: 2019-10-31 | End: 2020-03-02

## 2019-11-22 RX ORDER — IRBESARTAN 75 MG/1
75 TABLET ORAL
Qty: 90 TAB | Refills: 0 | Status: SHIPPED | OUTPATIENT
Start: 2019-11-22 | End: 2020-03-01 | Stop reason: SDUPTHER

## 2019-11-22 NOTE — TELEPHONE ENCOUNTER
Last refill- 6/3/19  Last office visit- 5/1/19  Next office visit -  Future Appointments   Date Time Provider Hebert Sarahy   1/28/2020 12:40 PM Олег Childress MD [id number] [facility]         Requested Prescriptions     Pending Prescriptions Disp Refills    irbesartan (AVAPRO) 75 mg tablet 90 Tab 0     Sig: Take 1 Tab by mouth nightly.

## 2020-01-06 RX ORDER — CITALOPRAM 10 MG/1
10 TABLET ORAL DAILY
Qty: 90 TAB | Refills: 1 | Status: CANCELLED | OUTPATIENT
Start: 2020-01-06

## 2020-01-07 RX ORDER — CITALOPRAM 10 MG/1
TABLET ORAL
Qty: 90 TAB | Refills: 0 | Status: SHIPPED | OUTPATIENT
Start: 2020-01-07 | End: 2020-04-06 | Stop reason: SDUPTHER

## 2020-01-08 NOTE — TELEPHONE ENCOUNTER
Rx responded to in separate encounter yesterday. No further action needed at this time. Last office visit - 11/5/2019  Next office visit -   Future Appointments   Date Time Provider Hebert Weathers   1/28/2020 12:40 PM [place]ra [place] [id number] [facility]         Requested Prescriptions     Pending Prescriptions Disp Refills    citalopram (CELEXA) 10 mg tablet 90 Tab 1     Sig: Take 1 Tab by mouth daily.

## 2020-01-28 ENCOUNTER — OFFICE VISIT (OUTPATIENT)
Dept: INTERNAL MEDICINE CLINIC | Age: 65
End: 2020-01-28

## 2020-01-28 VITALS
TEMPERATURE: 98.1 F | RESPIRATION RATE: 16 BRPM | SYSTOLIC BLOOD PRESSURE: 110 MMHG | OXYGEN SATURATION: 98 % | HEIGHT: 60 IN | BODY MASS INDEX: 25.13 KG/M2 | HEART RATE: 77 BPM | WEIGHT: 128 LBS | DIASTOLIC BLOOD PRESSURE: 66 MMHG

## 2020-01-28 DIAGNOSIS — Z00.00 ROUTINE GENERAL MEDICAL EXAMINATION AT A HEALTH CARE FACILITY: Primary | ICD-10-CM

## 2020-01-28 DIAGNOSIS — Z79.899 ENCOUNTER FOR LONG-TERM (CURRENT) USE OF MEDICATIONS: ICD-10-CM

## 2020-01-28 DIAGNOSIS — F32.A DEPRESSION, UNSPECIFIED DEPRESSION TYPE: ICD-10-CM

## 2020-01-28 DIAGNOSIS — E78.2 MIXED HYPERLIPIDEMIA: ICD-10-CM

## 2020-01-28 DIAGNOSIS — I10 BENIGN HYPERTENSION: ICD-10-CM

## 2020-01-28 DIAGNOSIS — M25.561 ARTHRALGIA OF RIGHT KNEE: ICD-10-CM

## 2020-01-28 RX ORDER — ZINC GLUCONATE 10 MG
LOZENGE ORAL
COMMUNITY

## 2020-01-28 RX ORDER — GLUCOSAMINE/CHONDR SU A SOD 750-600 MG
TABLET ORAL
COMMUNITY

## 2020-01-28 RX ORDER — MULTIVIT WITH MINERALS/HERBS
1 TABLET ORAL DAILY
COMMUNITY

## 2020-01-28 NOTE — PROGRESS NOTES
Verified name and birth date for privacy precautions. Chart reviewed in preparation for today's visit. Chief Complaint   Patient presents with    Complete Physical    Knee Pain     right knee pain x 4 months           Health Maintenance Due   Topic    Influenza Age 5 to Adult          Wt Readings from Last 3 Encounters:   01/28/20 128 lb (58.1 kg)   05/01/19 128 lb 9.6 oz (58.3 kg)   10/30/18 128 lb (58.1 kg)     Temp Readings from Last 3 Encounters:   01/28/20 98.1 °F (36.7 °C) (Oral)   05/01/19 98 °F (36.7 °C) (Oral)   10/30/18 96.8 °F (36 °C) (Oral)     BP Readings from Last 3 Encounters:   01/28/20 110/66   05/01/19 100/78   10/30/18 112/68     Pulse Readings from Last 3 Encounters:   01/28/20 77   05/01/19 76   10/30/18 82         Learning Assessment:  :     Learning Assessment 1/28/2020 5/1/2019 3/27/2018 8/13/2014 5/7/2013   PRIMARY LEARNER Patient Patient Patient Patient Patient   HIGHEST LEVEL OF EDUCATION - PRIMARY LEARNER  4 YEARS OF COLLEGE 4 YEARS OF COLLEGE 4 YEARS OF COLLEGE 4 YEARS OF COLLEGE 4 YEARS OF COLLEGE   BARRIERS PRIMARY LEARNER NONE NONE NONE NONE NONE   CO-LEARNER CAREGIVER No - No No -   PRIMARY LANGUAGE ENGLISH ENGLISH ENGLISH ENGLISH ENGLISH    NEED - - - No No   LEARNER PREFERENCE PRIMARY READING READING READING LISTENING READING   LEARNING SPECIAL TOPICS - - - no -   ANSWERED BY patient patient self patient patient   RELATIONSHIP SELF SELF SELF SELF SELF       Depression Screening:  :     3 most recent PHQ Screens 1/28/2020   Little interest or pleasure in doing things Not at all   Feeling down, depressed, irritable, or hopeless Not at all   Total Score PHQ 2 0       Fall Risk Assessment:  :     Fall Risk Assessment, last 12 mths 1/28/2020   Able to walk? Yes   Fall in past 12 months?  No   Fall with injury? -   Number of falls in past 12 months -   Fall Risk Score -       Abuse Screening:  :     Abuse Screening Questionnaire 1/28/2020 5/1/2019 3/27/2018 9/25/2017 2/18/2015 2/25/2014   Do you ever feel afraid of your partner? N N N N N N   Are you in a relationship with someone who physically or mentally threatens you? N N N N N N   Is it safe for you to go home?  Y Y Y Y Y Y       Coordination of Care Questionnaire:  :     1) Have you been to an emergency room, urgent care clinic since your last visit? no   Hospitalized since your last visit? no             2) Have you seen or consulted any other health care providers outside of [address] [place] [address] since your last visit? no  (Include any pap smears or colon screenings in this section.)    3) Do you have an Advance Directive on file? no        ------------------------------------------------

## 2020-01-28 NOTE — PROGRESS NOTES
Subjective:      Cece Espinal is a 60 y.o. female who presents today for her annual exam and follow up of her hypertension, hyperlipidemia and depression. Gyn - has had MARIKA/BSO  Screening mammogram was done at [facility] - date- 2/4/19  Screening colonoscopy was last completed with Dr. Zeeshan Payne 4/26/2016. follow up 2021    Retired in December, 2019. Even thought the stressors of work have been eliminated, she would like to continue to take her celexa for now. Exercise -  Nothing regular    Left knee pain. Some stiffness. Started collagen. Pain is worse after use and at night. No trauma. No swelling. Due for:  -mammogram - due after 2/4/2020  -flu - mid November, 2020. Patient Active Problem List   Diagnosis Code    Essential hypertension I10    Migraine G43.909    Herpes zoster B02.9    S/P MARIKA-BSO Z90.710, Z90.722, Z90.79    Anxiety F41.9    Unspecified vitamin D deficiency E55.9    Hx of screening mammography Z92.89    Pap smear for cervical cancer screening Z12.4    Colon cancer screening Z12.11    Hyperlipidemia E78.5    Environmental allergies Z91.09    Advance directive discussed with patient Z71.89    Mild depression ([facility] [address] F32.0     Current Outpatient Medications   Medication Sig Dispense Refill    b complex vitamins tablet Take 1 Tab by mouth daily.  magnesium 250 mg tab Take  by mouth.  COLLAGEN Take  by mouth daily.  Biotin 2,500 mcg cap Take  by mouth.  TURMERIC PO Take  by mouth.  citalopram (CELEXA) 10 mg tablet TAKE ONE TABLET BY MOUTH DAILY 90 Tab 0    irbesartan (AVAPRO) 75 mg tablet Take 1 Tab by mouth nightly. 90 Tab 0    estradiol (ESTRACE) 2 mg tablet TAKE ONE TABLET BY MOUTH EVERY OTHER DAY 30 Tab 1    rosuvastatin (CRESTOR) 5 mg tablet TAKE ONE TABLET BY MOUTH EVERY OTHER DAY 45 Tab 1    meclizine (ANTIVERT) 25 mg tablet Take 25 mg by mouth as needed.       fluticasone (FLONASE) 50 mcg/actuation nasal spray 2 Sprays by Both Nostrils route daily. (Patient taking differently: 2 Sprays by Both Nostrils route daily as needed.) 3 Bottle 1    ascorbic acid (VITAMIN C) 500 mg tablet Take 500 mg by mouth daily.  vitamin c-vitamin e (CRANBERRY CONCENTRATE) cap Take 1 Tab by mouth daily.  CETIRIZINE HCL (ZYRTEC PO) Take 10 mg by mouth daily.  cholecalciferol, vitamin d3, (VITAMIN D) 1,000 unit tablet Take 2,000 Units by mouth daily. Review of Systems    A comprehensive review of systems was negative except for that written in the HPI. Objective:     Visit Vitals  /66 (BP 1 Location: Left arm, BP Patient Position: Sitting)   Pulse 77   Temp 98.1 °F (36.7 °C) (Oral)   Resp 16   Ht 5' (1.524 m)   Wt 128 lb (58.1 kg)   SpO2 98%   BMI 25.00 kg/m²     General:  Alert, cooperative, no distress, appears stated age. Head:  Normocephalic, without obvious abnormality, atraumatic. Eyes:  Conjunctivae/corneas clear. PERRL, EOMs intact. Ears:  Normal TMs and external ear canals both ears. Nose: Nares normal. Septum midline. Mucosa normal. No drainage or sinus tenderness. Throat: Lips, mucosa, and tongue normal. Teeth and gums normal.   Neck: Supple, symmetrical, trachea midline, no adenopathy, thyroid: no enlargement/tenderness/nodules, no carotid bruit and no JVD. Back:   Symmetric, no curvature. ROM normal. No CVA tenderness. Lungs:   Clear to auscultation bilaterally. Chest wall:  No tenderness or deformity. Heart:  Regular rate and rhythm, S1, S2 normal, no murmur, click, rub or gallop. Breast Exam:  No tenderness, masses, or nipple abnormality. Abdomen:   Soft, non-tender. Bowel sounds normal. No masses,  No organomegaly. Extremities: Extremities normal, atraumatic, no cyanosis or edema. Mild crepitus in right knee. No effusion noted bilaterally. Pulses: 2+ and symmetric all extremities. Skin: Skin color, texture, turgor normal. No rashes or lesions.    Lymph nodes: Cervical, supraclavicular, and axillary nodes normal.           Assessment/Plan:     1. Routine general medical examination at a health care facility  -fasting labs ordered today.  -up to date with immunizations, mammogram, colonoscopy    - AMB POC EKG ROUTINE W/ 12 LEADS, INTER & REP  - CBC WITH AUTOMATED DIFF  - METABOLIC PANEL, COMPREHENSIVE  - LIPID PANEL  - UA WITH REFLEX MICRO AND CULTURE    Also, she has additional complaints of the following --.     2. Mixed hyperlipidemia  -due for fasting lipid panel at this time. Compliant with use of her crestor    - CBC WITH AUTOMATED DIFF  - METABOLIC PANEL, COMPREHENSIVE  - LIPID PANEL    3. Benign hypertension  -I evaluated and recommended to continue current doses of medications. - CBC WITH AUTOMATED DIFF  - METABOLIC PANEL, COMPREHENSIVE    4. Depression, unspecified depression type  -she would like to continue with use of her celexa at this time. 5. Encounter for long-term (current) use of medications      6. Arthralgia of right knee  -likely arthritis in right knee. -recommended exercise regularly to strengthen muscles around knee. 7. Menopause  -had hysterectomy in 2008  -on estrace 2mg every other day. Recommended wean estrace to 1 tab twice weekly. Will continue to wean at next visit in 6 months. Orders Placed This Encounter    CBC WITH AUTOMATED DIFF    METABOLIC PANEL, COMPREHENSIVE    LIPID PANEL    UA WITH REFLEX MICRO AND CULTURE    AMB POC EKG ROUTINE W/ 12 LEADS, INTER & REP     Order Specific Question:   Reason for Exam:     Answer:   CPE     Follow-up Disposition:     Follow up in 6 months     Return if symptoms worsen or fail to improve. Advised patient to call back or return to office if symptoms worsen/change/persist.     Discussed expected course/resolution/complications of diagnosis in detail with patient. Medication risks/benefits/costs/interactions/alternatives discussed with patient.    Patient was given an after visit summary which includes diagnoses, current medications, & vitals. Patient expressed understanding with the diagnosis and plan.

## 2020-01-29 LAB
ALBUMIN SERPL-MCNC: 4.9 G/DL (ref 3.8–4.8)
ALBUMIN/GLOB SERPL: 1.8 {RATIO} (ref 1.2–2.2)
ALP SERPL-CCNC: 71 IU/L (ref 39–117)
ALT SERPL-CCNC: 37 IU/L (ref 0–32)
APPEARANCE UR: CLEAR
AST SERPL-CCNC: 30 IU/L (ref 0–40)
BACTERIA #/AREA URNS HPF: NORMAL /[HPF]
BASOPHILS # BLD AUTO: 0.1 X10E3/UL (ref 0–0.2)
BASOPHILS NFR BLD AUTO: 1 %
BILIRUB SERPL-MCNC: 0.4 MG/DL (ref 0–1.2)
BILIRUB UR QL STRIP: NEGATIVE
BUN SERPL-MCNC: 13 MG/DL (ref 8–27)
BUN/CREAT SERPL: 17 (ref 12–28)
CALCIUM SERPL-MCNC: 9.5 MG/DL (ref 8.7–10.3)
CASTS URNS QL MICRO: NORMAL /LPF
CHLORIDE SERPL-SCNC: 99 MMOL/L (ref 96–106)
CHOLEST SERPL-MCNC: 196 MG/DL (ref 100–199)
CO2 SERPL-SCNC: 22 MMOL/L (ref 20–29)
COLOR UR: YELLOW
CREAT SERPL-MCNC: 0.76 MG/DL (ref 0.57–1)
EOSINOPHIL # BLD AUTO: 0.1 X10E3/UL (ref 0–0.4)
EOSINOPHIL NFR BLD AUTO: 3 %
EPI CELLS #/AREA URNS HPF: NORMAL /HPF (ref 0–10)
ERYTHROCYTE [DISTWIDTH] IN BLOOD BY AUTOMATED COUNT: 12.8 % (ref 11.7–15.4)
GLOBULIN SER CALC-MCNC: 2.7 G/DL (ref 1.5–4.5)
GLUCOSE SERPL-MCNC: 91 MG/DL (ref 65–99)
GLUCOSE UR QL: NEGATIVE
HCT VFR BLD AUTO: 33.3 % (ref 34–46.6)
HDLC SERPL-MCNC: 88 MG/DL
HGB BLD-MCNC: 11.7 G/DL (ref 11.1–15.9)
HGB UR QL STRIP: NEGATIVE
IMM GRANULOCYTES # BLD AUTO: 0 X10E3/UL (ref 0–0.1)
IMM GRANULOCYTES NFR BLD AUTO: 0 %
KETONES UR QL STRIP: NEGATIVE
LDLC SERPL CALC-MCNC: 91 MG/DL (ref 0–99)
LEUKOCYTE ESTERASE UR QL STRIP: NEGATIVE
LYMPHOCYTES # BLD AUTO: 1.3 X10E3/UL (ref 0.7–3.1)
LYMPHOCYTES NFR BLD AUTO: 36 %
MCH RBC QN AUTO: 31.5 PG (ref 26.6–33)
MCHC RBC AUTO-ENTMCNC: 35.1 G/DL (ref 31.5–35.7)
MCV RBC AUTO: 90 FL (ref 79–97)
MICRO URNS: ABNORMAL
MICRO URNS: ABNORMAL
MONOCYTES # BLD AUTO: 0.4 X10E3/UL (ref 0.1–0.9)
MONOCYTES NFR BLD AUTO: 12 %
MUCOUS THREADS URNS QL MICRO: PRESENT
NEUTROPHILS # BLD AUTO: 1.7 X10E3/UL (ref 1.4–7)
NEUTROPHILS NFR BLD AUTO: 48 %
NITRITE UR QL STRIP: NEGATIVE
PH UR STRIP: 8 [PH] (ref 5–7.5)
PLATELET # BLD AUTO: 254 X10E3/UL (ref 150–450)
POTASSIUM SERPL-SCNC: 4 MMOL/L (ref 3.5–5.2)
PROT SERPL-MCNC: 7.6 G/DL (ref 6–8.5)
PROT UR QL STRIP: NEGATIVE
RBC # BLD AUTO: 3.72 X10E6/UL (ref 3.77–5.28)
RBC #/AREA URNS HPF: NORMAL /HPF (ref 0–2)
SODIUM SERPL-SCNC: 139 MMOL/L (ref 134–144)
SP GR UR: 1.02 (ref 1–1.03)
TRIGL SERPL-MCNC: 83 MG/DL (ref 0–149)
URINALYSIS REFLEX, 377202: ABNORMAL
UROBILINOGEN UR STRIP-MCNC: 0.2 MG/DL (ref 0.2–1)
VLDLC SERPL CALC-MCNC: 17 MG/DL (ref 5–40)
WBC # BLD AUTO: 3.7 X10E3/UL (ref 3.4–10.8)
WBC #/AREA URNS HPF: NORMAL /HPF (ref 0–5)

## 2020-03-02 RX ORDER — ESTRADIOL 2 MG/1
TABLET ORAL
Qty: 8 TAB | Refills: 5 | Status: SHIPPED | OUTPATIENT
Start: 2020-03-02 | End: 2021-08-02

## 2020-04-01 RX ORDER — ROSUVASTATIN CALCIUM 5 MG/1
5 TABLET, COATED ORAL EVERY OTHER DAY
Qty: 45 TAB | Refills: 1 | Status: SHIPPED | OUTPATIENT
Start: 2020-04-01 | End: 2020-10-05 | Stop reason: SDUPTHER

## 2020-04-07 RX ORDER — CITALOPRAM 10 MG/1
10 TABLET ORAL DAILY
Qty: 90 TAB | Refills: 0 | Status: SHIPPED | OUTPATIENT
Start: 2020-04-07 | End: 2020-04-08

## 2020-06-03 ENCOUNTER — TELEPHONE (OUTPATIENT)
Dept: INTERNAL MEDICINE CLINIC | Age: 65
End: 2020-06-03

## 2020-06-03 NOTE — TELEPHONE ENCOUNTER
Irbersatan 75mg is on back order is there an alternative. Pt is completely out    [id number] [facility].   [phone]

## 2020-06-04 RX ORDER — TELMISARTAN 20 MG/1
20 TABLET ORAL DAILY
Qty: 30 TAB | Refills: 1 | Status: SHIPPED | OUTPATIENT
Start: 2020-06-04 | End: 2020-07-21 | Stop reason: ALTCHOICE

## 2020-06-04 NOTE — TELEPHONE ENCOUNTER
Irbesartan is on indefinite back order. Will replace with micardis 20mg daily. Patient has follow up in July , 2020    Orders Placed This Encounter    telmisartan (MICARDIS) 20 mg tablet     Sig: Take 1 Tab by mouth daily.      Dispense:  30 Tab     Refill:  1

## 2020-06-08 NOTE — TELEPHONE ENCOUNTER
I switched it to telmisartan 20mg daily on 6/4/2020 and was sent to her [facility] pharmacy. Please let her know.

## 2020-07-04 RX ORDER — CITALOPRAM 10 MG/1
TABLET ORAL
Qty: 90 TAB | Refills: 0 | Status: SHIPPED | OUTPATIENT
Start: 2020-07-04 | End: 2020-09-23

## 2020-07-21 ENCOUNTER — OFFICE VISIT (OUTPATIENT)
Dept: INTERNAL MEDICINE CLINIC | Age: 65
End: 2020-07-21

## 2020-07-21 VITALS
SYSTOLIC BLOOD PRESSURE: 99 MMHG | DIASTOLIC BLOOD PRESSURE: 79 MMHG | BODY MASS INDEX: 24.66 KG/M2 | TEMPERATURE: 97.6 F | RESPIRATION RATE: 19 BRPM | OXYGEN SATURATION: 98 % | WEIGHT: 125.6 LBS | HEART RATE: 76 BPM | HEIGHT: 60 IN

## 2020-07-21 DIAGNOSIS — Z79.899 ENCOUNTER FOR LONG-TERM (CURRENT) USE OF MEDICATIONS: ICD-10-CM

## 2020-07-21 DIAGNOSIS — E78.2 MIXED HYPERLIPIDEMIA: ICD-10-CM

## 2020-07-21 DIAGNOSIS — F32.A DEPRESSION, UNSPECIFIED DEPRESSION TYPE: ICD-10-CM

## 2020-07-21 DIAGNOSIS — I10 BENIGN HYPERTENSION: Primary | ICD-10-CM

## 2020-07-21 RX ORDER — IRBESARTAN 75 MG/1
TABLET ORAL
COMMUNITY
Start: 2020-06-16 | End: 2020-09-17 | Stop reason: SDUPTHER

## 2020-07-21 NOTE — PROGRESS NOTES
Subjective:      Aakash Sams is a 60 y.o. female who presents today for follow up of her hypertension, hyperlipidemia and depression. Doing well. No new concerns today. denies any chest pain, shortness of breath, syncope, headaches, nausea/vomiting, or any bowel changes. Patient Active Problem List   Diagnosis Code    Essential hypertension I10    Migraine G43.909    Herpes zoster B02.9    S/P MARIKA-BSO Z90.710, Z90.722, Z90.79    Anxiety F41.9    Unspecified vitamin D deficiency E55.9    Hx of screening mammography Z92.89    Pap smear for cervical cancer screening Z12.4    Colon cancer screening Z12.11    Hyperlipidemia E78.5    Environmental allergies Z91.09    Advance directive discussed with patient Z71.89    Mild depression (H[facility]) F32.0     Current Outpatient Medications   Medication Sig Dispense Refill    irbesartan (AVAPRO) 75 mg tablet       citalopram (CELEXA) 10 mg tablet TAKE ONE TABLET BY MOUTH DAILY 90 Tab 0    rosuvastatin (CRESTOR) 5 mg tablet Take 1 Tab by mouth every other day. 45 Tab 1    estradioL (ESTRACE) 2 mg tablet One tablet twice weekly. 8 Tab 5    b complex vitamins tablet Take 1 Tab by mouth daily.  magnesium 250 mg tab Take  by mouth.  COLLAGEN Take  by mouth daily.  Biotin 2,500 mcg cap Take  by mouth.  TURMERIC PO Take  by mouth.  meclizine (ANTIVERT) 25 mg tablet Take 25 mg by mouth as needed.  fluticasone (FLONASE) 50 mcg/actuation nasal spray 2 Sprays by Both Nostrils route daily. (Patient taking differently: 2 Sprays by Both Nostrils route daily as needed.) 3 Bottle 1    ascorbic acid (VITAMIN C) 500 mg tablet Take 500 mg by mouth daily.  vitamin c-vitamin e (CRANBERRY CONCENTRATE) cap Take 1 Tab by mouth daily.  CETIRIZINE HCL (ZYRTEC PO) Take 10 mg by mouth daily.  cholecalciferol, vitamin d3, (VITAMIN D) 1,000 unit tablet Take 2,000 Units by mouth daily.           Review of Systems    Pertinent items are noted in HPI. Objective: Wt Readings from Last 3 Encounters:   01/28/20 128 lb (58.1 kg)   05/01/19 128 lb 9.6 oz (58.3 kg)   10/30/18 128 lb (58.1 kg)     BP Readings from Last 3 Encounters:   01/28/20 110/66   05/01/19 100/78   10/30/18 112/68     Visit Vitals  BP 99/79 (BP 1 Location: Left arm, BP Patient Position: Sitting)   Pulse 76   Temp 97.6 °F (36.4 °C) (Temporal)   Resp 19   Ht 5' (1.524 m)   Wt 125 lb 9.6 oz (57 kg)   SpO2 98%   BMI 24.53 kg/m²     General appearance: alert, cooperative, no distress, appears stated age  Head: Normocephalic, without obvious abnormality, atraumatic  Neck: supple, symmetrical, trachea midline, no adenopathy, no carotid bruit and no JVD  Lungs: clear to auscultation bilaterally  Heart: regular rate and rhythm, S1, S2 normal, no murmur, click, rub or gallop  Abdomen: soft, non-tender. Bowel sounds normal. No masses,  no organomegaly  Extremities: extremities normal, atraumatic, no cyanosis or edema    Assessment/Plan:     .1. Mixed hyperlipidemia  -last completed on 1/28/2020, very well controlled. -continue crestor 5mg daily    2. Benign hypertension  -I evaluated and recommended to continue current doses of medications. 3. Depression, unspecified depression type  -quite stable with celexa 10mg daily. No adjustments needed    4. Encounter for long-term (current) use of medications       Orders Placed This Encounter    irbesartan (AVAPRO) 75 mg tablet      Follow-up Disposition:       Follow up in 6 months     Return if symptoms worsen or fail to improve. Advised patient to call back or return to office if symptoms worsen/change/persist.     Discussed expected course/resolution/complications of diagnosis in detail with patient. Medication risks/benefits/costs/interactions/alternatives discussed with patient. Patient was given an after visit summary which includes diagnoses, current medications, & vitals.    Patient expressed understanding with the diagnosis and plan.

## 2020-08-04 RX ORDER — TELMISARTAN 20 MG/1
TABLET ORAL
Qty: 30 TAB | Refills: 0 | OUTPATIENT
Start: 2020-08-04

## 2020-10-30 ENCOUNTER — HOSPITAL ENCOUNTER (OUTPATIENT)
Dept: MAMMOGRAPHY | Age: 65
Discharge: HOME | End: 2020-10-30
Attending: INTERNAL MEDICINE
Payer: COMMERCIAL

## 2020-10-30 DIAGNOSIS — Z12.31 SCREENING MAMMOGRAM FOR HIGH-RISK PATIENT: ICD-10-CM

## 2020-10-30 PROCEDURE — 77063 BREAST TOMOSYNTHESIS BI: CPT

## 2021-01-05 RX ORDER — ROSUVASTATIN CALCIUM 5 MG/1
5 TABLET, COATED ORAL EVERY OTHER DAY
Qty: 45 TAB | Refills: 1 | Status: SHIPPED | OUTPATIENT
Start: 2021-01-05 | End: 2021-09-23

## 2021-01-05 NOTE — TELEPHONE ENCOUNTER
Last OV 7/21/2020  Future Appointments   Date Time Provider eHbert Weathers   2/11/2021 10:00 AM Pablo Pelaez MD [facility]

## 2021-02-11 ENCOUNTER — OFFICE VISIT (OUTPATIENT)
Dept: INTERNAL MEDICINE CLINIC | Age: 66
End: 2021-02-11
Payer: COMMERCIAL

## 2021-02-11 VITALS
HEART RATE: 84 BPM | TEMPERATURE: 97.5 F | RESPIRATION RATE: 16 BRPM | OXYGEN SATURATION: 100 % | WEIGHT: 122.6 LBS | SYSTOLIC BLOOD PRESSURE: 99 MMHG | DIASTOLIC BLOOD PRESSURE: 59 MMHG | BODY MASS INDEX: 24.07 KG/M2 | HEIGHT: 60 IN

## 2021-02-11 DIAGNOSIS — F32.A DEPRESSION, UNSPECIFIED DEPRESSION TYPE: ICD-10-CM

## 2021-02-11 DIAGNOSIS — Z00.00 ANNUAL PHYSICAL EXAM: Primary | ICD-10-CM

## 2021-02-11 DIAGNOSIS — E78.2 MIXED HYPERLIPIDEMIA: ICD-10-CM

## 2021-02-11 DIAGNOSIS — I10 BENIGN HYPERTENSION: ICD-10-CM

## 2021-02-11 DIAGNOSIS — Z79.899 ENCOUNTER FOR LONG-TERM (CURRENT) USE OF MEDICATIONS: ICD-10-CM

## 2021-02-11 PROCEDURE — 99396 PREV VISIT EST AGE 40-64: CPT | Performed by: INTERNAL MEDICINE

## 2021-02-11 PROCEDURE — 99214 OFFICE O/P EST MOD 30 MIN: CPT | Performed by: INTERNAL MEDICINE

## 2021-02-11 NOTE — PROGRESS NOTES
Subjective:      Sergei Rodriguez is a 63 y.o. female who presents today for her annual physical. And follow up of her hypertension, hyperlipidemia and depression. Gyn care - has history of total hysterectomy  Screening mammogram was done at [facility] - date- 10/2020  Screening colonoscopy was last completed Dr. Renate Noel. 4/2016. Due for follow up this year. No new concerns at this time. Consultants:  -Dr. Renate Noel - GI    Current Outpatient Medications   Medication Sig Dispense Refill    glucosam-chondroit-C-manganese 500-400-2-0.33 mg cap       omega-3-dha-epa-dpa-fish oil 1,050-1,200 mg cap       rosuvastatin (CRESTOR) 5 mg tablet Take 1 Tab by mouth every other day. 45 Tab 1    citalopram (CELEXA) 10 mg tablet TAKE ONE TABLET BY MOUTH DAILY 90 Tab 1    irbesartan (AVAPRO) 75 mg tablet Take 1 Tab by mouth daily. 90 Tab 1    estradioL (ESTRACE) 2 mg tablet One tablet twice weekly. 8 Tab 5    b complex vitamins tablet Take 1 Tab by mouth daily.  magnesium 250 mg tab Take  by mouth.  COLLAGEN Take  by mouth daily.  Biotin 2,500 mcg cap Take  by mouth.  TURMERIC PO Take  by mouth.  meclizine (ANTIVERT) 25 mg tablet Take 25 mg by mouth as needed.  fluticasone (FLONASE) 50 mcg/actuation nasal spray 2 Sprays by Both Nostrils route daily. (Patient taking differently: 2 Sprays by Both Nostrils route daily as needed.) 3 Bottle 1    ascorbic acid (VITAMIN C) 500 mg tablet Take 500 mg by mouth daily.  vitamin c-vitamin e (CRANBERRY CONCENTRATE) cap Take 1 Tab by mouth daily.  CETIRIZINE HCL (ZYRTEC PO) Take 10 mg by mouth daily.  cholecalciferol, vitamin d3, (VITAMIN D) 1,000 unit tablet Take 2,000 Units by mouth daily. Review of Systems    Pertinent items are noted in HPI. Objective:      Wt Readings from Last 3 Encounters:   02/11/21 122 lb 9.6 oz (55.6 kg)   07/21/20 125 lb 9.6 oz (57 kg)   01/28/20 128 lb (58.1 kg)     BP Readings from Last 3 Encounters:   02/11/21 (!) 99/59   07/21/20 99/79   01/28/20 110/66     Visit Vitals  BP (!) 99/59   Pulse 84   Temp 97.5 °F (36.4 °C) (Temporal)   Resp 16   Ht 5' (1.524 m)   Wt 122 lb 9.6 oz (55.6 kg)   SpO2 100%   BMI 23.94 kg/m²     General appearance: alert, cooperative, no distress, appears stated age  Head: Normocephalic, without obvious abnormality, atraumatic  Eyes: negative findings: anicteric sclera, lids and lashes normal, conjunctivae and sclerae normal, corneas clear and pupils equal, round, reactive to light and accomodation  Ears: normal TM's and external ear canals AU  Neck: supple, symmetrical, trachea midline, no adenopathy, no carotid bruit and no JVD  Lungs: clear to auscultation bilaterally  Breasts: normal appearance, no masses or tenderness  Heart: regular rate and rhythm, S1, S2 normal, no murmur, click, rub or gallop  Abdomen: soft, non-tender. Bowel sounds normal. No masses,  no organomegaly  Extremities: extremities normal, atraumatic, no cyanosis or edema  Pulses: 2+ and symmetric  Neurologic: Alert and oriented X 3, normal strength and tone. Normal coordination and gait    Assessment/Plan:     1. Annual physical exam  -due for screening colonoscopy . Patient is aware and will call to schedule.   -up to date with screening mammogram.     - CBC WITH AUTOMATED DIFF; Future  - METABOLIC PANEL, COMPREHENSIVE; Future  - LIPID PANEL; Future  - URINALYSIS W/ REFLEX CULTURE; Future  - LIPID PANEL  - METABOLIC PANEL, COMPREHENSIVE  - CBC WITH AUTOMATED DIFF    Also, she has additional complaints of the following --.     2. Mixed hyperlipidemia  -Fasting labs ordered today.   -maintain low fat diet. - CBC WITH AUTOMATED DIFF; Future  - METABOLIC PANEL, COMPREHENSIVE; Future  - LIPID PANEL; Future  - URINALYSIS W/ REFLEX CULTURE; Future  - LIPID PANEL  - METABOLIC PANEL, COMPREHENSIVE  - CBC WITH AUTOMATED DIFF    3.  Benign hypertension  -I evaluated and recommended to continue current doses of medications. - CBC WITH AUTOMATED DIFF; Future  - METABOLIC PANEL, COMPREHENSIVE; Future  - LIPID PANEL; Future  - URINALYSIS W/ REFLEX CULTURE; Future  - LIPID PANEL  - METABOLIC PANEL, COMPREHENSIVE  - CBC WITH AUTOMATED DIFF    4. Depression, unspecified depression type  -patient reports that her current dose of celexa is working well to control and balance her depression at this time. 5. Encounter for long-term (current) use of medications    Orders Placed This Encounter    CBC WITH AUTOMATED DIFF     Standing Status:   Future     Number of Occurrences:   1     Standing Expiration Date:   6/61/2347    METABOLIC PANEL, COMPREHENSIVE     Standing Status:   Future     Number of Occurrences:   1     Standing Expiration Date:   2/11/2022    LIPID PANEL     Standing Status:   Future     Number of Occurrences:   1     Standing Expiration Date:   2/11/2022    URINALYSIS W/ REFLEX CULTURE     Standing Status:   Future     Standing Expiration Date:   2/11/2022    glucosam-chondroit-C-manganese 500-400-2-0.33 mg cap    omega-3-dha-epa-dpa-fish oil 1,050-1,200 mg cap         Follow-up Disposition:     Follow up in 6 months     Return if symptoms worsen or fail to improve. Advised patient to call back or return to office if symptoms worsen/change/persist.     Discussed expected course/resolution/complications of diagnosis in detail with patient. Medication risks/benefits/costs/interactions/alternatives discussed with patient. Patient was given an after visit summary which includes diagnoses, current medications, & vitals. Patient expressed understanding with the diagnosis and plan.

## 2021-02-12 LAB
ALBUMIN SERPL-MCNC: 4.1 G/DL (ref 3.5–5)
ALBUMIN/GLOB SERPL: 1.3 {RATIO} (ref 1.1–2.2)
ALP SERPL-CCNC: 65 U/L (ref 45–117)
ALT SERPL-CCNC: 30 U/L (ref 12–78)
AMORPH CRY URNS QL MICRO: ABNORMAL
ANION GAP SERPL CALC-SCNC: 5 MMOL/L (ref 5–15)
APPEARANCE UR: ABNORMAL
AST SERPL-CCNC: 20 U/L (ref 15–37)
BACTERIA URNS QL MICRO: ABNORMAL /HPF
BASOPHILS # BLD: 0.1 K/UL (ref 0–0.1)
BASOPHILS NFR BLD: 1 % (ref 0–1)
BILIRUB SERPL-MCNC: 0.6 MG/DL (ref 0.2–1)
BILIRUB UR QL: NEGATIVE
BUN SERPL-MCNC: 14 MG/DL (ref 6–20)
BUN/CREAT SERPL: 18 (ref 12–20)
CALCIUM SERPL-MCNC: 9.3 MG/DL (ref 8.5–10.1)
CAOX CRY URNS QL MICRO: ABNORMAL
CHLORIDE SERPL-SCNC: 106 MMOL/L (ref 97–108)
CHOLEST SERPL-MCNC: 180 MG/DL
CO2 SERPL-SCNC: 30 MMOL/L (ref 21–32)
COLOR UR: ABNORMAL
CREAT SERPL-MCNC: 0.79 MG/DL (ref 0.55–1.02)
DIFFERENTIAL METHOD BLD: ABNORMAL
EOSINOPHIL # BLD: 0.1 K/UL (ref 0–0.4)
EOSINOPHIL NFR BLD: 3 % (ref 0–7)
EPITH CASTS URNS QL MICRO: ABNORMAL /LPF
ERYTHROCYTE [DISTWIDTH] IN BLOOD BY AUTOMATED COUNT: 13.1 % (ref 11.5–14.5)
GLOBULIN SER CALC-MCNC: 3.2 G/DL (ref 2–4)
GLUCOSE SERPL-MCNC: 78 MG/DL (ref 65–100)
GLUCOSE UR STRIP.AUTO-MCNC: NEGATIVE MG/DL
HCT VFR BLD AUTO: 33.8 % (ref 35–47)
HDLC SERPL-MCNC: 81 MG/DL
HDLC SERPL: 2.2 {RATIO} (ref 0–5)
HGB BLD-MCNC: 11.2 G/DL (ref 11.5–16)
HGB UR QL STRIP: NEGATIVE
HYALINE CASTS URNS QL MICRO: ABNORMAL /LPF (ref 0–5)
IMM GRANULOCYTES # BLD AUTO: 0 K/UL (ref 0–0.04)
IMM GRANULOCYTES NFR BLD AUTO: 0 % (ref 0–0.5)
KETONES UR QL STRIP.AUTO: ABNORMAL MG/DL
LDLC SERPL CALC-MCNC: 80.6 MG/DL (ref 0–100)
LEUKOCYTE ESTERASE UR QL STRIP.AUTO: NEGATIVE
LIPID PROFILE,FLP: NORMAL
LYMPHOCYTES # BLD: 1.8 K/UL (ref 0.8–3.5)
LYMPHOCYTES NFR BLD: 43 % (ref 12–49)
MCH RBC QN AUTO: 31.5 PG (ref 26–34)
MCHC RBC AUTO-ENTMCNC: 33.1 G/DL (ref 30–36.5)
MCV RBC AUTO: 94.9 FL (ref 80–99)
MONOCYTES # BLD: 0.5 K/UL (ref 0–1)
MONOCYTES NFR BLD: 11 % (ref 5–13)
NEUTS SEG # BLD: 1.7 K/UL (ref 1.8–8)
NEUTS SEG NFR BLD: 42 % (ref 32–75)
NITRITE UR QL STRIP.AUTO: NEGATIVE
NRBC # BLD: 0 K/UL (ref 0–0.01)
NRBC BLD-RTO: 0 PER 100 WBC
PH UR STRIP: 5.5 [PH] (ref 5–8)
PLATELET # BLD AUTO: 219 K/UL (ref 150–400)
PMV BLD AUTO: 10.4 FL (ref 8.9–12.9)
POTASSIUM SERPL-SCNC: 3.6 MMOL/L (ref 3.5–5.1)
PROT SERPL-MCNC: 7.3 G/DL (ref 6.4–8.2)
PROT UR STRIP-MCNC: NEGATIVE MG/DL
RBC # BLD AUTO: 3.56 M/UL (ref 3.8–5.2)
RBC #/AREA URNS HPF: ABNORMAL /HPF (ref 0–5)
SODIUM SERPL-SCNC: 141 MMOL/L (ref 136–145)
SP GR UR REFRACTOMETRY: 1.02 (ref 1–1.03)
TRIGL SERPL-MCNC: 92 MG/DL (ref ?–150)
UA: UC IF INDICATED,UAUC: ABNORMAL
UROBILINOGEN UR QL STRIP.AUTO: 0.2 EU/DL (ref 0.2–1)
VLDLC SERPL CALC-MCNC: 18.4 MG/DL
WBC # BLD AUTO: 4.1 K/UL (ref 3.6–11)
WBC URNS QL MICRO: ABNORMAL /HPF (ref 0–4)

## 2021-02-15 NOTE — PROGRESS NOTES
Your labs are all stable. No new recommendations or medication adjustments needed at this time.  Pt notified via Alerts 2/15/2021

## 2021-03-16 RX ORDER — IRBESARTAN 75 MG/1
75 TABLET ORAL DAILY
Qty: 90 TAB | Refills: 1 | Status: SHIPPED | OUTPATIENT
Start: 2021-03-16 | End: 2021-09-13 | Stop reason: SDUPTHER

## 2021-06-27 RX ORDER — CITALOPRAM 10 MG/1
TABLET ORAL
Qty: 90 TABLET | Refills: 0 | Status: SHIPPED | OUTPATIENT
Start: 2021-06-27 | End: 2021-09-13 | Stop reason: SDUPTHER

## 2021-08-02 RX ORDER — ESTRADIOL 2 MG/1
TABLET ORAL
Qty: 24 TABLET | Refills: 0 | Status: SHIPPED
Start: 2021-08-02 | End: 2021-08-19 | Stop reason: ALTCHOICE

## 2021-08-19 ENCOUNTER — OFFICE VISIT (OUTPATIENT)
Dept: INTERNAL MEDICINE CLINIC | Age: 66
End: 2021-08-19
Payer: COMMERCIAL

## 2021-08-19 VITALS
DIASTOLIC BLOOD PRESSURE: 58 MMHG | HEART RATE: 66 BPM | TEMPERATURE: 97.1 F | HEIGHT: 60 IN | BODY MASS INDEX: 23.56 KG/M2 | OXYGEN SATURATION: 99 % | SYSTOLIC BLOOD PRESSURE: 94 MMHG | RESPIRATION RATE: 16 BRPM | WEIGHT: 120 LBS

## 2021-08-19 DIAGNOSIS — E78.2 MIXED HYPERLIPIDEMIA: ICD-10-CM

## 2021-08-19 DIAGNOSIS — F32.A MILD DEPRESSION: ICD-10-CM

## 2021-08-19 DIAGNOSIS — Z79.899 ENCOUNTER FOR LONG-TERM (CURRENT) USE OF MEDICATIONS: ICD-10-CM

## 2021-08-19 DIAGNOSIS — R39.15 URGENCY OF URINATION: Primary | ICD-10-CM

## 2021-08-19 DIAGNOSIS — I10 BENIGN HYPERTENSION: ICD-10-CM

## 2021-08-19 LAB
BILIRUB UR QL STRIP: NEGATIVE
GLUCOSE UR-MCNC: NEGATIVE MG/DL
KETONES P FAST UR STRIP-MCNC: NEGATIVE MG/DL
PH UR STRIP: 5.5 [PH] (ref 4.6–8)
PROT UR QL STRIP: NEGATIVE
SP GR UR STRIP: 1.03 (ref 1–1.03)
UA UROBILINOGEN AMB POC: NORMAL (ref 0.2–1)
URINALYSIS CLARITY POC: NORMAL
URINALYSIS COLOR POC: YELLOW
URINE BLOOD POC: NEGATIVE
URINE LEUKOCYTES POC: NEGATIVE
URINE NITRITES POC: NEGATIVE

## 2021-08-19 PROCEDURE — 99214 OFFICE O/P EST MOD 30 MIN: CPT | Performed by: INTERNAL MEDICINE

## 2021-08-19 PROCEDURE — 81003 URINALYSIS AUTO W/O SCOPE: CPT | Performed by: INTERNAL MEDICINE

## 2021-08-19 NOTE — PROGRESS NOTES
Assessment/Plan:     1. Urgency of urination  -POC urinalysis did not indicate recurrent UTI today. -recommended increasing water intake.   -with frequency and recurrence of UTI, recommended switching her oral estrogen to vaginal estrogen. She is already taking a cranberry tablet daily. -encouraged her to reduce caffeine intake. - AMB POC URINALYSIS DIP STICK AUTO W/O MICRO    2. Benign hypertension  -I evaluated and recommended to continue current doses of medications. 3. Mild depression ([address]  -I evaluated and recommended to continue current doses of medications. 4. Mixed hyperlipidemia  -need fasting lipid panel at next visit. 5. Encounter for long-term (current) use of medications       Orders Placed This Encounter    AMB POC URINALYSIS DIP STICK AUTO W/O MICRO    conjugated estrogens (PREMARIN) 0.625 mg/gram vaginal cream     Si.5g PV daily for 2 weeks, then taper to 2-3 times per week. Dispense:  30 g     Refill:  3        Follow-up Disposition:     Follow up in 6 months               Subjective:      Araceli Bell is a 63 y.o. female who presents today for follow up of her hypertension, hyperlipidemia and depression. Since last visit:  -she was treated for a UTI  .  Was treated in . Took course of ciprofloxacin. In July, she went urology, Dr. Lucas Choi and finished another course of antibiotics, augmentin, and now the symptoms are back. Still drinking mostly sodas. Has been trying to increase water intake. Symptoms are mostly burning with urination. -moving more after [facility], not 'locked' to a computer anymore. She has also been watching her diet. Patient Care Team:  -Dr. Kelechi Luna - GI       Objective:      Wt Readings from Last 3 Encounters:   21 122 lb 9.6 oz (55.6 kg)   20 125 lb 9.6 oz (57 kg)   20 128 lb (58.1 kg)     BP Readings from Last 3 Encounters:   21 (!) 99/59   20 99/79   20 110/66     Visit Vitals  BP (!) 94/58 (BP 1 Location: Left arm, BP Patient Position: Sitting, BP Cuff Size: Adult)   Pulse 66   Temp 97.1 °F (36.2 °C) (Temporal)   Resp 16   Ht 5' (1.524 m) Comment: obtained at previous visit   Wt 120 lb (54.4 kg)   SpO2 99%   BMI 23.44 kg/m²     General appearance: alert, cooperative, no distress, appears stated age  Head: Normocephalic, without obvious abnormality, atraumatic  Neck: supple, symmetrical, trachea midline, no adenopathy, no carotid bruit and no JVD  Lungs: clear to auscultation bilaterally  Heart: regular rate and rhythm, S1, S2 normal, no murmur, click, rub or gallop  Abdomen: soft, non-tender. Bowel sounds normal. No masses,  no organomegaly  Extremities: extremities normal, atraumatic, no cyanosis or edema  Pulses: 2+ and symmetric      Results for orders placed or performed in visit on 08/19/21   AMB POC URINALYSIS DIP STICK AUTO W/O MICRO   Result Value Ref Range    Color (UA POC) Yellow     Clarity (UA POC) Cloudy     Glucose (UA POC) Negative Negative    Bilirubin (UA POC) Negative Negative    Ketones (UA POC) Negative Negative    Specific gravity (UA POC) 1.030 1.001 - 1.035    Blood (UA POC) Negative Negative    pH (UA POC) 5.5 4.6 - 8.0    Protein (UA POC) Negative Negative    Urobilinogen (UA POC) 0.2 mg/dL 0.2 - 1    Nitrites (UA POC) Negative Negative    Leukocyte esterase (UA POC) Negative Negative            Disclaimer:  Return if symptoms worsen or fail to improve. Advised patient to call back or return to office if symptoms worsen/change/persist.     Discussed expected course/resolution/complications of diagnosis in detail with patient. Medication risks/benefits/costs/interactions/alternatives discussed with patient. Patient was given an after visit summary which includes diagnoses, current medications, & vitals. Patient expressed understanding with the diagnosis and plan.

## 2021-08-19 NOTE — PROGRESS NOTES
Verified name and birth date for privacy precautions. Chart reviewed in preparation for today's visit. Chief Complaint   Patient presents with    Hypertension    Urgency     follow up from urgent care           Health Maintenance Due   Topic    COVID-19 Vaccine (1)         Wt Readings from Last 3 Encounters:   08/19/21 120 lb (54.4 kg)   02/11/21 122 lb 9.6 oz (55.6 kg)   07/21/20 125 lb 9.6 oz (57 kg)     Temp Readings from Last 3 Encounters:   08/19/21 97.1 °F (36.2 °C) (Temporal)   02/11/21 97.5 °F (36.4 °C) (Temporal)   07/21/20 97.6 °F (36.4 °C) (Temporal)     BP Readings from Last 3 Encounters:   08/19/21 (!) 94/58   02/11/21 (!) 99/59   07/21/20 99/79     Pulse Readings from Last 3 Encounters:   08/19/21 66   02/11/21 84   07/21/20 76         Learning Assessment:  :     Learning Assessment 1/28/2020 5/1/2019 3/27/2018 8/13/2014 5/7/2013   PRIMARY LEARNER Patient Patient Patient Patient Patient   HIGHEST LEVEL OF EDUCATION - PRIMARY LEARNER  4 YEARS OF COLLEGE 4 YEARS OF COLLEGE 4 YEARS OF COLLEGE 4 YEARS OF COLLEGE 4 YEARS OF COLLEGE   BARRIERS PRIMARY LEARNER NONE NONE NONE NONE NONE   CO-LEARNER CAREGIVER No - No No -   PRIMARY LANGUAGE ENGLISH ENGLISH ENGLISH ENGLISH ENGLISH    NEED - - - No No   LEARNER PREFERENCE PRIMARY READING READING READING LISTENING READING   LEARNING SPECIAL TOPICS - - - no -   ANSWERED BY patient patient self patient patient   RELATIONSHIP SELF SELF SELF SELF SELF       Depression Screening:  :     3 most recent PHQ Screens 8/19/2021   Little interest or pleasure in doing things Not at all   Feeling down, depressed, irritable, or hopeless Not at all   Total Score PHQ 2 0       Fall Risk Assessment:  :     Fall Risk Assessment, last 12 mths 1/28/2020   Able to walk? Yes   Fall in past 12 months? No   Number of falls in past 12 months -   Fall with injury?  -       Abuse Screening:  :     Abuse Screening Questionnaire 8/19/2021 1/28/2020 5/1/2019 3/27/2018 9/25/2017 2/18/2015 2/25/2014   Do you ever feel afraid of your partner? N N N N N N N   Are you in a relationship with someone who physically or mentally threatens you? N N N N N N N   Is it safe for you to go home?  Mando Pérez

## 2021-09-14 NOTE — TELEPHONE ENCOUNTER
Last OV 8/19/21  Future Appointments   Date Time Provider Hebert Weathers   2/15/2022 10:00 AM Tariq Aguirre MD [facility] AMB

## 2021-09-16 RX ORDER — IRBESARTAN 75 MG/1
75 TABLET ORAL DAILY
Qty: 90 TABLET | Refills: 1 | Status: SHIPPED | OUTPATIENT
Start: 2021-09-16 | End: 2021-12-16

## 2021-09-16 RX ORDER — CITALOPRAM 10 MG/1
10 TABLET ORAL DAILY
Qty: 90 TABLET | Refills: 0 | Status: SHIPPED | OUTPATIENT
Start: 2021-09-16 | End: 2021-09-21

## 2022-01-07 ENCOUNTER — TRANSCRIBE ORDER (OUTPATIENT)
Dept: SCHEDULING | Age: 67
End: 2022-01-07

## 2022-01-07 DIAGNOSIS — Z12.31 VISIT FOR SCREENING MAMMOGRAM: Primary | ICD-10-CM

## 2022-02-15 ENCOUNTER — OFFICE VISIT (OUTPATIENT)
Dept: INTERNAL MEDICINE CLINIC | Age: 67
End: 2022-02-15
Payer: COMMERCIAL

## 2022-02-15 VITALS
OXYGEN SATURATION: 98 % | TEMPERATURE: 97.1 F | RESPIRATION RATE: 15 BRPM | HEIGHT: 60 IN | HEART RATE: 73 BPM | WEIGHT: 115.8 LBS | BODY MASS INDEX: 22.74 KG/M2 | DIASTOLIC BLOOD PRESSURE: 77 MMHG | SYSTOLIC BLOOD PRESSURE: 127 MMHG

## 2022-02-15 DIAGNOSIS — Z00.00 ANNUAL PHYSICAL EXAM: Primary | ICD-10-CM

## 2022-02-15 DIAGNOSIS — E78.2 MIXED HYPERLIPIDEMIA: ICD-10-CM

## 2022-02-15 DIAGNOSIS — M25.561 CHRONIC PAIN OF RIGHT KNEE: ICD-10-CM

## 2022-02-15 DIAGNOSIS — Z79.899 ENCOUNTER FOR LONG-TERM (CURRENT) USE OF MEDICATIONS: ICD-10-CM

## 2022-02-15 DIAGNOSIS — G89.29 CHRONIC PAIN OF RIGHT KNEE: ICD-10-CM

## 2022-02-15 DIAGNOSIS — I10 BENIGN HYPERTENSION: ICD-10-CM

## 2022-02-15 DIAGNOSIS — F32.A MILD DEPRESSION: ICD-10-CM

## 2022-02-15 LAB
ALBUMIN SERPL-MCNC: 4.1 G/DL (ref 3.5–5)
ALBUMIN/GLOB SERPL: 1.2 {RATIO} (ref 1.1–2.2)
ALP SERPL-CCNC: 75 U/L (ref 45–117)
ALT SERPL-CCNC: 45 U/L (ref 12–78)
ANION GAP SERPL CALC-SCNC: 5 MMOL/L (ref 5–15)
APPEARANCE UR: CLEAR
AST SERPL-CCNC: 30 U/L (ref 15–37)
BACTERIA URNS QL MICRO: ABNORMAL /HPF
BASOPHILS # BLD: 0 K/UL (ref 0–0.1)
BASOPHILS NFR BLD: 1 % (ref 0–1)
BILIRUB SERPL-MCNC: 0.4 MG/DL (ref 0.2–1)
BILIRUB UR QL: NEGATIVE
BUN SERPL-MCNC: 16 MG/DL (ref 6–20)
BUN/CREAT SERPL: 20 (ref 12–20)
CALCIUM SERPL-MCNC: 9.4 MG/DL (ref 8.5–10.1)
CHLORIDE SERPL-SCNC: 104 MMOL/L (ref 97–108)
CHOLEST SERPL-MCNC: 176 MG/DL
CO2 SERPL-SCNC: 29 MMOL/L (ref 21–32)
COLOR UR: ABNORMAL
CREAT SERPL-MCNC: 0.82 MG/DL (ref 0.55–1.02)
DIFFERENTIAL METHOD BLD: ABNORMAL
EOSINOPHIL # BLD: 0.2 K/UL (ref 0–0.4)
EOSINOPHIL NFR BLD: 4 % (ref 0–7)
EPITH CASTS URNS QL MICRO: ABNORMAL /LPF
ERYTHROCYTE [DISTWIDTH] IN BLOOD BY AUTOMATED COUNT: 13 % (ref 11.5–14.5)
GLOBULIN SER CALC-MCNC: 3.4 G/DL (ref 2–4)
GLUCOSE SERPL-MCNC: 90 MG/DL (ref 65–100)
GLUCOSE UR STRIP.AUTO-MCNC: NEGATIVE MG/DL
HCT VFR BLD AUTO: 35.3 % (ref 35–47)
HDLC SERPL-MCNC: 83 MG/DL
HDLC SERPL: 2.1 {RATIO} (ref 0–5)
HGB BLD-MCNC: 11.5 G/DL (ref 11.5–16)
HGB UR QL STRIP: NEGATIVE
HYALINE CASTS URNS QL MICRO: ABNORMAL /LPF (ref 0–5)
IMM GRANULOCYTES # BLD AUTO: 0 K/UL (ref 0–0.04)
IMM GRANULOCYTES NFR BLD AUTO: 0 % (ref 0–0.5)
KETONES UR QL STRIP.AUTO: ABNORMAL MG/DL
LDLC SERPL CALC-MCNC: 78.4 MG/DL (ref 0–100)
LEUKOCYTE ESTERASE UR QL STRIP.AUTO: NEGATIVE
LYMPHOCYTES # BLD: 1.9 K/UL (ref 0.8–3.5)
LYMPHOCYTES NFR BLD: 46 % (ref 12–49)
MCH RBC QN AUTO: 31.3 PG (ref 26–34)
MCHC RBC AUTO-ENTMCNC: 32.6 G/DL (ref 30–36.5)
MCV RBC AUTO: 96.2 FL (ref 80–99)
MONOCYTES # BLD: 0.5 K/UL (ref 0–1)
MONOCYTES NFR BLD: 12 % (ref 5–13)
NEUTS SEG # BLD: 1.6 K/UL (ref 1.8–8)
NEUTS SEG NFR BLD: 37 % (ref 32–75)
NITRITE UR QL STRIP.AUTO: NEGATIVE
NRBC # BLD: 0 K/UL (ref 0–0.01)
NRBC BLD-RTO: 0 PER 100 WBC
PH UR STRIP: 6 [PH] (ref 5–8)
PLATELET # BLD AUTO: 233 K/UL (ref 150–400)
PMV BLD AUTO: 10.2 FL (ref 8.9–12.9)
POTASSIUM SERPL-SCNC: 3.8 MMOL/L (ref 3.5–5.1)
PROT SERPL-MCNC: 7.5 G/DL (ref 6.4–8.2)
PROT UR STRIP-MCNC: NEGATIVE MG/DL
RBC # BLD AUTO: 3.67 M/UL (ref 3.8–5.2)
RBC #/AREA URNS HPF: ABNORMAL /HPF (ref 0–5)
SODIUM SERPL-SCNC: 138 MMOL/L (ref 136–145)
SP GR UR REFRACTOMETRY: 1.02 (ref 1–1.03)
TRIGL SERPL-MCNC: 73 MG/DL (ref ?–150)
UA: UC IF INDICATED,UAUC: ABNORMAL
UROBILINOGEN UR QL STRIP.AUTO: 0.2 EU/DL (ref 0.2–1)
VLDLC SERPL CALC-MCNC: 14.6 MG/DL
WBC # BLD AUTO: 4.2 K/UL (ref 3.6–11)
WBC URNS QL MICRO: ABNORMAL /HPF (ref 0–4)

## 2022-02-15 PROCEDURE — 99396 PREV VISIT EST AGE 40-64: CPT | Performed by: INTERNAL MEDICINE

## 2022-02-15 NOTE — PROGRESS NOTES
Assessment/Plan:     1. Benign hypertension  -I evaluated and recommended to continue current doses of medications. 2. Mild depression ([address]  -very well controlled with current dose of celexa. Patient would like to continue use of medication. 3. Mixed hyperlipidemia  -need fasting lipid panel today. 4. Encounter for long-term (current) use of medications      5. Chronic pain of right knee  -she would like to consult with Dr. Favio Booth. 6. Annual physical exam  -up to date with mammogram and gyn exam and colonoscopy   -fasting labs today. - CBC WITH AUTOMATED DIFF; Future  - METABOLIC PANEL, COMPREHENSIVE; Future  - LIPID PANEL; Future  - URINALYSIS W/ REFLEX CULTURE; Future  - URINALYSIS W/ REFLEX CULTURE  - LIPID PANEL  - METABOLIC PANEL, COMPREHENSIVE  - CBC WITH AUTOMATED DIFF         Follow-up Disposition:                   Subjective:      Dennys De Anda is a 61 y.o. female who presents today for her annual exam and follow up of her hypertension, hyperlipidemia and depression    Since last visit 8/19/2021:  -having intermittent right knee pain. Sister just had knee replaced with Dr. Favio Booth. Taking Fish oil and collagen which has decreased her pain. -started on estrogen vaginal cream just a couple weeks ago. Has completed taper of oral estrogen.     -has lost weight by 'watching my diet'  . Staying active through gardening. Patient Care Team:  -Dr. Konstantin Flores - GI    Due for:  -need colonoscopy - Dr. Soraida Bianchi       Objective:      Wt Readings from Last 3 Encounters:   02/15/22 115 lb 12.8 oz (52.5 kg)   08/19/21 120 lb (54.4 kg)   02/11/21 122 lb 9.6 oz (55.6 kg)     BP Readings from Last 3 Encounters:   02/15/22 127/77   08/19/21 (!) 94/58   02/11/21 (!) 99/59     Visit Vitals  /77   Pulse 73   Temp 97.1 °F (36.2 °C) (Temporal)   Resp 15   Ht 5' (1.524 m)   Wt 115 lb 12.8 oz (52.5 kg)   SpO2 98%   BMI 22.62 kg/m²     General appearance: alert, cooperative, no distress, appears stated age  Head: Normocephalic, without obvious abnormality, atraumatic  Eyes: negative  Ears: normal TM's and external ear canals AU  Neck: supple, symmetrical, trachea midline, no adenopathy, no carotid bruit and no JVD  Lungs: clear to auscultation bilaterally  Breasts: normal appearance, no masses or tenderness  Heart: regular rate and rhythm, S1, S2 normal, no murmur, click, rub or gallop  Abdomen: soft, non-tender. Bowel sounds normal. No masses,  no organomegaly  Extremities: extremities normal, atraumatic, no cyanosis or edema  Pulses: 2+ and symmetric              Disclaimer:  Return if symptoms worsen or fail to improve. Advised patient to call back or return to office if symptoms worsen/change/persist.     Discussed expected course/resolution/complications of diagnosis in detail with patient. Medication risks/benefits/costs/interactions/alternatives discussed with patient. Patient was given an after visit summary which includes diagnoses, current medications, & vitals. Patient expressed understanding with the diagnosis and plan.

## 2022-02-24 ENCOUNTER — HOSPITAL ENCOUNTER (OUTPATIENT)
Dept: MAMMOGRAPHY | Age: 67
Discharge: HOME | End: 2022-02-24
Attending: INTERNAL MEDICINE
Payer: COMMERCIAL

## 2022-02-24 DIAGNOSIS — Z12.31 VISIT FOR SCREENING MAMMOGRAM: ICD-10-CM

## 2022-02-24 PROCEDURE — 77063 BREAST TOMOSYNTHESIS BI: CPT

## 2022-03-18 PROBLEM — F32.A MILD DEPRESSION: Status: ACTIVE | Noted: 2018-10-30

## 2022-03-19 PROBLEM — Z71.89 ADVANCE DIRECTIVE DISCUSSED WITH PATIENT: Status: ACTIVE | Noted: 2017-03-20

## 2022-04-08 RX ORDER — ROSUVASTATIN CALCIUM 5 MG/1
TABLET, COATED ORAL
Qty: 45 TABLET | Refills: 1 | Status: SHIPPED | OUTPATIENT
Start: 2022-04-08 | End: 2022-04-14

## 2022-04-14 RX ORDER — ROSUVASTATIN CALCIUM 5 MG/1
TABLET, COATED ORAL
Qty: 45 TABLET | Refills: 1 | Status: SHIPPED | OUTPATIENT
Start: 2022-04-14

## 2022-08-15 ENCOUNTER — OFFICE VISIT (OUTPATIENT)
Dept: INTERNAL MEDICINE CLINIC | Age: 67
End: 2022-08-15
Payer: COMMERCIAL

## 2022-08-15 VITALS
HEART RATE: 68 BPM | RESPIRATION RATE: 16 BRPM | TEMPERATURE: 98.4 F | HEIGHT: 62 IN | BODY MASS INDEX: 21.38 KG/M2 | DIASTOLIC BLOOD PRESSURE: 66 MMHG | SYSTOLIC BLOOD PRESSURE: 121 MMHG | WEIGHT: 116.2 LBS | OXYGEN SATURATION: 99 %

## 2022-08-15 DIAGNOSIS — Z79.899 ENCOUNTER FOR LONG-TERM (CURRENT) USE OF MEDICATIONS: ICD-10-CM

## 2022-08-15 DIAGNOSIS — F32.A MILD DEPRESSION: ICD-10-CM

## 2022-08-15 DIAGNOSIS — E78.2 MIXED HYPERLIPIDEMIA: ICD-10-CM

## 2022-08-15 DIAGNOSIS — Z12.11 COLON CANCER SCREENING: ICD-10-CM

## 2022-08-15 DIAGNOSIS — M79.622 LEFT AXILLARY PAIN: ICD-10-CM

## 2022-08-15 DIAGNOSIS — I10 BENIGN HYPERTENSION: Primary | ICD-10-CM

## 2022-08-15 DIAGNOSIS — D22.9 MULTIPLE ATYPICAL SKIN MOLES: ICD-10-CM

## 2022-08-15 PROCEDURE — 99214 OFFICE O/P EST MOD 30 MIN: CPT | Performed by: INTERNAL MEDICINE

## 2022-08-15 NOTE — PROGRESS NOTES
Assessment/Plan:     1. Benign hypertension  -I evaluated and recommended to continue current doses of medications. 2. Mild depression  -stable. Continue current dose of celexa 10mg daily    3. Mixed hyperlipidemia  -need fasting lipid panel next visit  -taking rosuvastatin 5mg daily    4. Encounter for long-term (current) use of medications      5. Colon cancer screening  -encouraged follow up with GI for screening colonoscopy . Due last year. - REFERRAL TO GASTROENTEROLOGY    6. Multiple atypical skin moles  Referred to [facility] dermatology    - REFERRAL TO DERMATOLOGY    7. Left axillary pain  -no abnormality palpated. Likely muscular in nature. Orders Placed This Encounter    [facility] OF THE [facility]     Referral Priority:   Routine     Referral Type:   Consultation     Referral Reason:   Specialty Services Required     Referred to Provider:   Radha Elam MD     Number of Visits Requested:   1    REFERRAL TO DERMATOLOGY     Referral Priority:   Routine     Referral Type:   Consultation     Referral Reason:   Specialty Services Required     Referred to Provider:   Nithya Costa MD     Requested Specialty:   Dermatology Physician     Number of Visits Requested:   1    COQ10, LIPOSOMAL UBIQUINOL, PO     Sig: Take  by mouth daily. Follow-up Disposition:     Follow up in 6 months               Subjective:      Joann Yanez is a 61 y.o. female who presents today for follow up of her hypertension, depression, and hyperlipidemia     Since last visit :  -Covid in June, 2022. Lost taste for 1 week. Has resolved. Had some fullness in her right ear.    -noted discomfort and possible large lymph node in her left axilla.    -has not had her colon cancer screening follow up     -has moles that she would like dermatology to remove. Patient Care Team:  -Dr. Maribel Calderón - GI    Due for:  -screening colonoscopy         Objective:      Wt Readings from Last 3 Encounters:   02/15/22 115 lb 12.8 oz (52.5 kg)   08/19/21 120 lb (54.4 kg)   02/11/21 122 lb 9.6 oz (55.6 kg)     BP Readings from Last 3 Encounters:   02/15/22 127/77   08/19/21 (!) 94/58   02/11/21 (!) 99/59     Visit Vitals  /66   Pulse 68   Temp 98.4 °F (36.9 °C) (Temporal)   Resp 16   Ht 5' 1.5\" (1.562 m)   Wt 116 lb 3.2 oz (52.7 kg)   SpO2 99%   BMI 21.60 kg/m²     General appearance: alert, cooperative, no distress, appears stated age  Head: Normocephalic, without obvious abnormality, atraumatic  Ears: normal TM's and external ear canals AU  Neck: supple, symmetrical, trachea midline, no adenopathy, no carotid bruit, and no JVD  Lungs: clear to auscultation bilaterally  Heart: regular rate and rhythm, S1, S2 normal, no murmur, click, rub or gallop  Abdomen: soft, non-tender. Bowel sounds normal. No masses,  no organomegaly  Extremities: extremities normal, atraumatic, no cyanosis or edema  Pulses: 2+ and symmetric  Bilateral axilla: no lymphadenopathy noted              Disclaimer:  Return if symptoms worsen or fail to improve. Advised patient to call back or return to office if symptoms worsen/change/persist.     Discussed expected course/resolution/complications of diagnosis in detail with patient. Medication risks/benefits/costs/interactions/alternatives discussed with patient. Patient was given an after visit summary which includes diagnoses, current medications, & vitals. Patient expressed understanding with the diagnosis and plan.

## 2023-03-01 ENCOUNTER — TRANSCRIBE ORDER (OUTPATIENT)
Dept: SCHEDULING | Age: 68
End: 2023-03-01

## 2023-03-01 DIAGNOSIS — Z12.31 VISIT FOR SCREENING MAMMOGRAM: Primary | ICD-10-CM

## 2023-03-29 PROBLEM — F32.0 MAJOR DEPRESSIVE DISORDER, SINGLE EPISODE, MILD (HCC): Status: ACTIVE | Noted: 2023-03-29

## 2023-03-29 NOTE — PROGRESS NOTES
Assessment/Plan:     1. Benign hypertension  -controlled with use of irbesartan 75mg daily.     - CBC WITH AUTOMATED DIFF; Future  - METABOLIC PANEL, COMPREHENSIVE; Future  - LIPID PANEL; Future  - URINALYSIS W/ REFLEX CULTURE; Future  - TSH 3RD GENERATION; Future  - TSH 3RD GENERATION  - URINALYSIS W/ REFLEX CULTURE  - LIPID PANEL  - METABOLIC PANEL, COMPREHENSIVE  - CBC WITH AUTOMATED DIFF    2. Major depressive disorder, single episode, mild (HCC)  -mood is very stable with use of citalopram 10mg daily. No adjustments needed    3. Mixed hyperlipidemia  -need fasting lipid panel today.   -taking rosuvastatin 5mg daily. 4. Encounter for long-term (current) use of medications      5. Encounter for screening mammogram for breast cancer  -due for screening mammogram . Scheduled for 4/12/2023    6. Colon cancer screening  -she will contact her GI for scheduling her colonoscopy    7. Menopause  -recommended osteoporosis screening.     - DEXA BONE DENSITY STUDY AXIAL; Future    8. Welcome to Medicare preventive visit  -completed today  -[facility] decision maker reviewed with patient and updated. -Mammogram is scheduled for next week.  -she will call GI for her follow up colonoscopy   - AMB POC EKG ROUTINE W/ 12 LEADS, SCREEN ()  - [address]    9. Screening for depression    - [address]    10. Urgency of urination  -using Premarin vag cream.  Too expensive. Would like estrace cream which is cheaper. Will request when it is time for refill. Follow-up Disposition:     Follow up in 6 months           Subjective:      Chery Little is a 74 y.o. female who presents today for her Medicare Wellness Exam and follow up of her hypertension, hyperlipidemia, and depression    Since last visit :  -has lost weight. Has not been eating as much since she retired and not going out to eat as much.        Patient Care Team:  -Dr. Joseph Sommer - GI    Due for:  -mammogram - last 2/24/2022  -colonoscopy - last 4/2016 and was to follow up in 2021  -pneumovax  -DXA    Objective: Wt Readings from Last 3 Encounters:   08/15/22 116 lb 3.2 oz (52.7 kg)   02/15/22 115 lb 12.8 oz (52.5 kg)   08/19/21 120 lb (54.4 kg)     BP Readings from Last 3 Encounters:   08/15/22 121/66   02/15/22 127/77   08/19/21 (!) 94/58     Visit Vitals  BP (!) 97/52   Pulse 74   Temp 97.8 °F (36.6 °C) (Temporal)   Resp 16   Ht 4' 11.5\" (1.511 m)   Wt 112 lb (50.8 kg)   SpO2 99%   BMI 22.24 kg/m²     General appearance: alert, cooperative, no distress, appears stated age  Head: Normocephalic, without obvious abnormality, atraumatic  Eyes: negative  Ears: normal TM's and external ear canals AU  Neck: supple, symmetrical, trachea midline, no adenopathy, thyroid: not enlarged, symmetric, no tenderness/mass/nodules, no carotid bruit, and no JVD  Lungs: clear to auscultation bilaterally  Breasts: normal appearance, no masses or tenderness  Heart: regular rate and rhythm, S1, S2 normal, no murmur, click, rub or gallop  Abdomen: soft, non-tender. Bowel sounds normal. No masses,  no organomegaly  Extremities: extremities normal, atraumatic, no cyanosis or edema  Pulses: 2+ and symmetric  Neurologic: Grossly normal              Disclaimer:  Return if symptoms worsen or fail to improve. Advised patient to call back or return to office if symptoms worsen/change/persist.     Discussed expected course/resolution/complications of diagnosis in detail with patient. Medication risks/benefits/costs/interactions/alternatives discussed with patient. Patient was given an after visit summary which includes diagnoses, current medications, & vitals. Patient expressed understanding with the diagnosis and plan.     This is a \"Welcome to [employer]"  Initial Preventive Physical Examination (IPPE) providing Personalized Prevention Plan Services (Performed in the first 12 months of enrollment)    I have reviewed the patient's medical history in detail and updated the computerized patient record. Assessment/Plan   Education and counseling provided:  Are appropriate based on today's review and evaluation    1. Welcome to Medicare preventive visit  -     AMB POC EKG ROUTINE W/ 12 LEADS, SCREEN ()  -     DEPRESSION SCREEN ANNUAL  2. Benign hypertension  3. Major depressive disorder, single episode, mild ([address]  4. Mixed hyperlipidemia  5. Encounter for long-term (current) use of medications  6. Encounter for screening mammogram for breast cancer  7. Colon cancer screening  8. Menopause  9. Screening for depression  -     DEPRESSION SCREEN ANNUAL       Depression Risk Screen     3 most recent PHQ Screens 3/30/2023   Little interest or pleasure in doing things Not at all   Feeling down, depressed, irritable, or hopeless Not at all   Total Score PHQ 2 0       Alcohol & Drug Abuse Risk Screen    Do you average more than 1 drink per night or more than 7 drinks a week:  No    On any one occasion in the past three months have you have had more than 3 drinks containing alcohol:  No          Functional Ability and Level of Safety    Diet: No special diet      Hearing: Hearing is good. Vision Screening:  Vision is good. No results found. Activities of Daily Living: The home contains: handrails and grab bars  Patient does total self care      Ambulation: with no difficulty      Exercise level: moderately active     Fall Risk Screen:  Fall Risk Assessment, last 12 mths 3/30/2023   Able to walk? Yes   Fall in past 12 months? 0   Do you feel unsteady? 0   Are you worried about falling 0   Number of falls in past 12 months -   Fall with injury? -      Abuse Screen:  Patient is not abused       Screening EKG   EKG order placed: Yes    End of Life Planning   Advanced care planning directives were discussed with the patient and /or family/caregiver.      Health Maintenance Due     Health Maintenance Due   Topic Date Due    COVID-19 Vaccine (3 - Booster for Pfizer series) 07/18/2021    Bone Densitometry (Dexa) Screening  10/07/2022    Pneumococcal 65+ years (1 - PCV) Never done    Lipid Screen  02/15/2023       Patient Care Team   Patient Care Team:  Natalie Jennings MD as PCP - General (Internal Medicine Physician)  Natalie Jennings MD as PCP - [facility]  Moy Navarro MD (Gastroenterology)  Neymar San MD (Ophthalmology)    History     Past Medical History:   Diagnosis Date    Anemia 2/19/2009    Herpes zoster 3/16/2010    History of estrogen therapy 2008    Estradiol    HTN 2/19/2009    Menopause     LMP-2008    Menorrhagia 2/19/2009    Migraine 2/19/2009    Nephrolithiasis 2/19/2009    S/P abdominal hysterectomy 6/30/2009    Shingles 2/19/2009    Unspecified vitamin D deficiency 12/28/2011      Past Surgical History:   Procedure Laterality Date    HX COLONOSCOPY  4/29/16    small polyp & int hem - next 2021 - [place]    HX HYSTERECTOMY  2008     Current Outpatient Medications   Medication Sig Dispense Refill    irbesartan (AVAPRO) 75 mg tablet Take 1 Tablet by mouth daily. 90 Tablet 1    citalopram (CELEXA) 10 mg tablet TAKE ONE TABLET BY MOUTH DAILY 90 Tablet 1    conjugated estrogens (PREMARIN) 0.625 mg/gram vaginal cream 0.5g PV daily for 2 weeks, then taper to 2-3 times per week. 30 g 3    COQ10, LIPOSOMAL UBIQUINOL, PO Take  by mouth daily. rosuvastatin (CRESTOR) 5 mg tablet TAKE ONE TABLET BY MOUTH EVERY OTHER DAY 45 Tablet 1    glucosam-chondroit-C-manganese 500-400-2-0.33 mg cap       omega-3-dha-epa-dpa-fish oil 1,050-1,200 mg cap       b complex vitamins tablet Take 1 Tab by mouth daily. magnesium 250 mg tab Take  by mouth. COLLAGEN Take  by mouth daily. Biotin 2,500 mcg cap Take  by mouth. TURMERIC PO Take 1 Tablet by mouth daily. meclizine (ANTIVERT) 25 mg tablet Take 25 mg by mouth as needed. ascorbic acid, vitamin C, (VITAMIN C) 500 mg tablet Take 500 mg by mouth daily.       CETIRIZINE HCL (ZYRTEC PO) Take 10 mg by mouth daily. cholecalciferol (VITAMIN D3) (1000 Units /25 mcg) tablet Take 2,000 Units by mouth daily.        Allergies   Allergen Reactions    Ace Inhibitors Cough    Demerol [Meperidine] Nausea and Vomiting    Lipitor [Atorvastatin] Myalgia    Monopril [Fosinopril] Other (comments)     fatigue       Family History   Problem Relation Age of Onset    Hypertension Mother     Coronary Art Dis Mother     Stroke Mother     Elevated Lipids Mother     Heart Disease Mother     OSTEOARTHRITIS Father     Cancer Father         prostate    Hypertension Brother     Anxiety Brother      Social History     Tobacco Use    Smoking status: Never    Smokeless tobacco: Never   Substance Use Topics    Alcohol use: No       Soni Contreras MD

## 2023-03-30 ENCOUNTER — OFFICE VISIT (OUTPATIENT)
Dept: INTERNAL MEDICINE CLINIC | Age: 68
End: 2023-03-30
Payer: MEDICARE

## 2023-03-30 VITALS
DIASTOLIC BLOOD PRESSURE: 52 MMHG | HEIGHT: 60 IN | SYSTOLIC BLOOD PRESSURE: 97 MMHG | BODY MASS INDEX: 21.99 KG/M2 | RESPIRATION RATE: 16 BRPM | HEART RATE: 74 BPM | WEIGHT: 112 LBS | TEMPERATURE: 97.8 F | OXYGEN SATURATION: 99 %

## 2023-03-30 DIAGNOSIS — I10 BENIGN HYPERTENSION: ICD-10-CM

## 2023-03-30 DIAGNOSIS — Z00.00 WELCOME TO MEDICARE PREVENTIVE VISIT: Primary | ICD-10-CM

## 2023-03-30 DIAGNOSIS — Z79.899 ENCOUNTER FOR LONG-TERM (CURRENT) USE OF MEDICATIONS: ICD-10-CM

## 2023-03-30 DIAGNOSIS — Z12.31 ENCOUNTER FOR SCREENING MAMMOGRAM FOR BREAST CANCER: ICD-10-CM

## 2023-03-30 DIAGNOSIS — F32.0 MAJOR DEPRESSIVE DISORDER, SINGLE EPISODE, MILD (HCC): ICD-10-CM

## 2023-03-30 DIAGNOSIS — R39.15 URGENCY OF URINATION: ICD-10-CM

## 2023-03-30 DIAGNOSIS — E78.2 MIXED HYPERLIPIDEMIA: ICD-10-CM

## 2023-03-30 DIAGNOSIS — Z12.11 COLON CANCER SCREENING: ICD-10-CM

## 2023-03-30 DIAGNOSIS — Z78.0 MENOPAUSE: ICD-10-CM

## 2023-03-30 DIAGNOSIS — Z13.31 SCREENING FOR DEPRESSION: ICD-10-CM

## 2023-03-30 PROCEDURE — 1090F PRES/ABSN URINE INCON ASSESS: CPT | Performed by: INTERNAL MEDICINE

## 2023-03-30 PROCEDURE — G0403 EKG FOR INITIAL PREVENT EXAM: HCPCS | Performed by: INTERNAL MEDICINE

## 2023-03-30 PROCEDURE — 1101F PT FALLS ASSESS-DOCD LE1/YR: CPT | Performed by: INTERNAL MEDICINE

## 2023-03-30 PROCEDURE — G0463 HOSPITAL OUTPT CLINIC VISIT: HCPCS | Performed by: INTERNAL MEDICINE

## 2023-03-30 PROCEDURE — 99213 OFFICE O/P EST LOW 20 MIN: CPT | Performed by: INTERNAL MEDICINE

## 2023-03-30 PROCEDURE — G0402 INITIAL PREVENTIVE EXAM: HCPCS | Performed by: INTERNAL MEDICINE

## 2023-03-30 PROCEDURE — G9717 DOC PT DX DEP/BP F/U NT REQ: HCPCS | Performed by: INTERNAL MEDICINE

## 2023-03-30 PROCEDURE — G9899 SCRN MAM PERF RSLTS DOC: HCPCS | Performed by: INTERNAL MEDICINE

## 2023-03-30 PROCEDURE — G8420 CALC BMI NORM PARAMETERS: HCPCS | Performed by: INTERNAL MEDICINE

## 2023-03-30 PROCEDURE — 3017F COLORECTAL CA SCREEN DOC REV: CPT | Performed by: INTERNAL MEDICINE

## 2023-03-30 PROCEDURE — G8536 NO DOC ELDER MAL SCRN: HCPCS | Performed by: INTERNAL MEDICINE

## 2023-03-30 PROCEDURE — G8427 DOCREV CUR MEDS BY ELIG CLIN: HCPCS | Performed by: INTERNAL MEDICINE

## 2023-03-30 PROCEDURE — G8399 PT W/DXA RESULTS DOCUMENT: HCPCS | Performed by: INTERNAL MEDICINE

## 2023-03-30 NOTE — PATIENT INSTRUCTIONS
Medicare Wellness Visit, Female     The best way to live healthy is to have a lifestyle where you eat a well-balanced diet, exercise regularly, limit alcohol use, and quit all forms of tobacco/nicotine, if applicable. Regular preventive services are another way to keep healthy. Preventive services (vaccines, screening tests, monitoring & exams) can help personalize your care plan, which helps you manage your own care. Screening tests can find health problems at the earliest stages, when they are easiest to treat. [facility] follows the current, evidence-based guidelines published by the [facility] ([facility]) when recommending preventive services for our patients. Because we follow these guidelines, sometimes recommendations change over time as research supports it. (For example, mammograms used to be recommended annually. Even though Medicare will still pay for an annual mammogram, the newer guidelines recommend a mammogram every two years for women of average risk). Of course, you and your doctor may decide to screen more often for some diseases, based on your risk and your co-morbidities (chronic disease you are already diagnosed with). Preventive services for you include:  - Medicare offers their members a free annual wellness visit, which is time for you and your primary care provider to discuss and plan for your preventive service needs.  Take advantage of this benefit every year!    -Over the age of 72 should receive the recommended pneumonia vaccines.    -All adults should have a flu vaccine yearly.  -All adults should have a tetanus vaccine every 10 years.   -Over the age 48 should receive the shingles vaccines.        -All adults should be screened once for Hepatitis C.  -All adults age 38-68 who are overweight should have a diabetes screening test once every three years.   -Other screening tests and preventive services for persons with diabetes include: an eye exam to screen for diabetic retinopathy, a kidney function test, a foot exam, and stricter control over your cholesterol.   -Cardiovascular screening for adults with routine risk involves an electrocardiogram (ECG) at intervals determined by your doctor.     -Colorectal cancer screenings should be done for adults age 39-70 with no increased risk factors for colorectal cancer. There are a number of acceptable methods of screening for this type of cancer. Each test has its own benefits and drawbacks. Discuss with your doctor what is most appropriate for you during your annual wellness visit. The different tests include: colonoscopy (considered the best screening method), a fecal occult blood test, a fecal DNA test, and sigmoidoscopy.    -Lung cancer screening is recommended annually with a low dose CT scan for adults between age 54 and 68, who have smoked at least 30 pack years (equivalent of 1 pack per day for 30 days), and who is a current smoker or quit less than 15 years ago.    -A bone mass density test is recommended when a woman turns 65 to screen for osteoporosis. This test is only recommended one time, as a screening. Some providers will use this same test as a disease monitoring tool if you already have osteoporosis. -Breast cancer screenings are recommended every other year for women of normal risk, age 54-69.    -Cervical cancer screenings for women over age 72 are only recommended with certain risk factors.      H

## 2023-03-30 NOTE — PROGRESS NOTES
Verified name and birth date for privacy precautions. Chart reviewed in preparation for today's visit. Chief Complaint   Patient presents with    Complete Physical          Health Maintenance Due   Topic    COVID-19 Vaccine (3 - Booster for Pfizer series)    Bone Densitometry (Dexa) Screening     Pneumococcal 65+ years (1 - PCV)    Lipid Screen     Medicare Yearly Exam          Wt Readings from Last 3 Encounters:   03/30/23 112 lb (50.8 kg)   08/15/22 116 lb 3.2 oz (52.7 kg)   02/15/22 115 lb 12.8 oz (52.5 kg)     Temp Readings from Last 3 Encounters:   03/30/23 97.8 °F (36.6 °C) (Temporal)   08/15/22 98.4 °F (36.9 °C) (Temporal)   02/15/22 97.1 °F (36.2 °C) (Temporal)     BP Readings from Last 3 Encounters:   03/30/23 (!) 97/52   08/15/22 121/66   02/15/22 127/77     Pulse Readings from Last 3 Encounters:   03/30/23 74   08/15/22 68   02/15/22 73         Learning Assessment:  :     Learning Assessment 1/28/2020 5/1/2019 3/27/2018 8/13/2014 5/7/2013   PRIMARY LEARNER Patient Patient Patient Patient Patient   HIGHEST LEVEL OF EDUCATION - PRIMARY LEARNER  4 YEARS OF COLLEGE 4 YEARS OF COLLEGE 4 YEARS OF COLLEGE 4 YEARS OF COLLEGE 4 YEARS OF COLLEGE   BARRIERS PRIMARY LEARNER NONE NONE NONE NONE NONE   CO-LEARNER CAREGIVER No - No No -   PRIMARY LANGUAGE ENGLISH ENGLISH ENGLISH ENGLISH ENGLISH    NEED - - - No No   LEARNER PREFERENCE PRIMARY READING READING READING LISTENING READING   LEARNING SPECIAL TOPICS - - - no -   ANSWERED BY patient patient self patient patient   RELATIONSHIP SELF SELF SELF SELF SELF       Depression Screening:  :     3 most recent PHQ Screens 3/30/2023   Little interest or pleasure in doing things Not at all   Feeling down, depressed, irritable, or hopeless Not at all   Total Score PHQ 2 0       Fall Risk Assessment:  :     Fall Risk Assessment, last 12 mths 3/30/2023   Able to walk? Yes   Fall in past 12 months? 0   Do you feel unsteady?  0   Are you worried about falling 0 Number of falls in past 12 months -   Fall with injury? -       Abuse Screening:  :     Abuse Screening Questionnaire 3/30/2023 8/19/2021 1/28/2020 5/1/2019 3/27/2018 9/25/2017 2/18/2015   Do you ever feel afraid of your partner? N N N N N N N   Are you in a relationship with someone who physically or mentally threatens you? N N N N N N N   Is it safe for you to go home?  Jetty [place]

## 2023-03-31 LAB
ALBUMIN SERPL-MCNC: 4.3 G/DL (ref 3.5–5)
ALBUMIN/GLOB SERPL: 1.3 (ref 1.1–2.2)
ALP SERPL-CCNC: 74 U/L (ref 45–117)
ALT SERPL-CCNC: 28 U/L (ref 12–78)
ANION GAP SERPL CALC-SCNC: 4 MMOL/L (ref 5–15)
APPEARANCE UR: ABNORMAL
AST SERPL-CCNC: 19 U/L (ref 15–37)
BACTERIA URNS QL MICRO: NEGATIVE /HPF
BASOPHILS # BLD: 0 K/UL (ref 0–0.1)
BASOPHILS NFR BLD: 1 % (ref 0–1)
BILIRUB SERPL-MCNC: 0.4 MG/DL (ref 0.2–1)
BILIRUB UR QL: NEGATIVE
BUN SERPL-MCNC: 17 MG/DL (ref 6–20)
BUN/CREAT SERPL: 21 (ref 12–20)
CALCIUM SERPL-MCNC: 9.5 MG/DL (ref 8.5–10.1)
CHLORIDE SERPL-SCNC: 105 MMOL/L (ref 97–108)
CHOLEST SERPL-MCNC: 202 MG/DL
CO2 SERPL-SCNC: 30 MMOL/L (ref 21–32)
COLOR UR: ABNORMAL
CREAT SERPL-MCNC: 0.82 MG/DL (ref 0.55–1.02)
DIFFERENTIAL METHOD BLD: ABNORMAL
EOSINOPHIL # BLD: 0.2 K/UL (ref 0–0.4)
EOSINOPHIL NFR BLD: 4 % (ref 0–7)
EPITH CASTS URNS QL MICRO: ABNORMAL /LPF
ERYTHROCYTE [DISTWIDTH] IN BLOOD BY AUTOMATED COUNT: 12.9 % (ref 11.5–14.5)
GLOBULIN SER CALC-MCNC: 3.2 G/DL (ref 2–4)
GLUCOSE SERPL-MCNC: 91 MG/DL (ref 65–100)
GLUCOSE UR STRIP.AUTO-MCNC: NEGATIVE MG/DL
HCT VFR BLD AUTO: 36.3 % (ref 35–47)
HDLC SERPL-MCNC: 96 MG/DL
HDLC SERPL: 2.1 (ref 0–5)
HGB BLD-MCNC: 11.6 G/DL (ref 11.5–16)
HGB UR QL STRIP: NEGATIVE
HYALINE CASTS URNS QL MICRO: ABNORMAL /LPF (ref 0–5)
IMM GRANULOCYTES # BLD AUTO: 0 K/UL (ref 0–0.04)
IMM GRANULOCYTES NFR BLD AUTO: 0 % (ref 0–0.5)
KETONES UR QL STRIP.AUTO: NEGATIVE MG/DL
LDLC SERPL CALC-MCNC: 89 MG/DL (ref 0–100)
LEUKOCYTE ESTERASE UR QL STRIP.AUTO: NEGATIVE
LYMPHOCYTES # BLD: 2 K/UL (ref 0.8–3.5)
LYMPHOCYTES NFR BLD: 45 % (ref 12–49)
MCH RBC QN AUTO: 30.9 PG (ref 26–34)
MCHC RBC AUTO-ENTMCNC: 32 G/DL (ref 30–36.5)
MCV RBC AUTO: 96.8 FL (ref 80–99)
MONOCYTES # BLD: 0.5 K/UL (ref 0–1)
MONOCYTES NFR BLD: 11 % (ref 5–13)
NEUTS SEG # BLD: 1.7 K/UL (ref 1.8–8)
NEUTS SEG NFR BLD: 39 % (ref 32–75)
NITRITE UR QL STRIP.AUTO: NEGATIVE
NRBC # BLD: 0 K/UL (ref 0–0.01)
NRBC BLD-RTO: 0 PER 100 WBC
PH UR STRIP: 5.5 (ref 5–8)
PLATELET # BLD AUTO: 241 K/UL (ref 150–400)
PMV BLD AUTO: 9.4 FL (ref 8.9–12.9)
POTASSIUM SERPL-SCNC: 3.5 MMOL/L (ref 3.5–5.1)
PROT SERPL-MCNC: 7.5 G/DL (ref 6.4–8.2)
PROT UR STRIP-MCNC: NEGATIVE MG/DL
RBC # BLD AUTO: 3.75 M/UL (ref 3.8–5.2)
RBC #/AREA URNS HPF: ABNORMAL /HPF (ref 0–5)
SODIUM SERPL-SCNC: 139 MMOL/L (ref 136–145)
SP GR UR REFRACTOMETRY: 1.02 (ref 1–1.03)
TRIGL SERPL-MCNC: 85 MG/DL (ref ?–150)
TSH SERPL DL<=0.05 MIU/L-ACNC: 2.2 UIU/ML (ref 0.36–3.74)
UA: UC IF INDICATED,UAUC: ABNORMAL
UROBILINOGEN UR QL STRIP.AUTO: 0.2 EU/DL (ref 0.2–1)
VLDLC SERPL CALC-MCNC: 17 MG/DL
WBC # BLD AUTO: 4.3 K/UL (ref 3.6–11)
WBC URNS QL MICRO: ABNORMAL /HPF (ref 0–4)

## 2023-04-17 PROBLEM — Z92.89 H/O BONE DENSITY STUDY: Status: ACTIVE | Noted: 2023-04-17

## 2023-04-18 ENCOUNTER — PATIENT MESSAGE (OUTPATIENT)
Dept: INTERNAL MEDICINE CLINIC | Age: 68
End: 2023-04-18

## 2023-04-22 DIAGNOSIS — Z12.31 VISIT FOR SCREENING MAMMOGRAM: Primary | ICD-10-CM

## 2023-05-11 ENCOUNTER — OFFICE VISIT (OUTPATIENT)
Age: 68
End: 2023-05-11
Payer: MEDICARE

## 2023-05-11 ENCOUNTER — TELEPHONE (OUTPATIENT)
Age: 68
End: 2023-05-11

## 2023-05-11 VITALS
SYSTOLIC BLOOD PRESSURE: 100 MMHG | RESPIRATION RATE: 14 BRPM | DIASTOLIC BLOOD PRESSURE: 60 MMHG | HEART RATE: 78 BPM | OXYGEN SATURATION: 98 % | HEIGHT: 60 IN | WEIGHT: 111.6 LBS | BODY MASS INDEX: 21.91 KG/M2 | TEMPERATURE: 97.7 F
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
## 2023-05-11 PROCEDURE — 1123F ACP DISCUSS/DSCN MKR DOCD: CPT | Performed by: INTERNAL MEDICINE

## 2023-05-11 PROCEDURE — G8420 CALC BMI NORM PARAMETERS: HCPCS | Performed by: INTERNAL MEDICINE

## 2023-05-11 PROCEDURE — 3074F SYST BP LT 130 MM HG: CPT | Performed by: INTERNAL MEDICINE

## 2023-05-11 PROCEDURE — 3017F COLORECTAL CA SCREEN DOC REV: CPT | Performed by: INTERNAL MEDICINE

## 2023-05-11 PROCEDURE — 99214 OFFICE O/P EST MOD 30 MIN: CPT | Performed by: INTERNAL MEDICINE

## 2023-05-11 PROCEDURE — 1090F PRES/ABSN URINE INCON ASSESS: CPT | Performed by: INTERNAL MEDICINE

## 2023-05-11 PROCEDURE — 3078F DIAST BP <80 MM HG: CPT | Performed by: INTERNAL MEDICINE

## 2023-05-11 PROCEDURE — G8399 PT W/DXA RESULTS DOCUMENT: HCPCS | Performed by: INTERNAL MEDICINE

## 2023-05-11 PROCEDURE — 1036F TOBACCO NON-USER: CPT | Performed by: INTERNAL MEDICINE

## 2023-05-11 PROCEDURE — G8428 CUR MEDS NOT DOCUMENT: HCPCS | Performed by: INTERNAL MEDICINE

## 2023-05-11 RX ORDER — METHOCARBAMOL 750 MG/1
750 TABLET, FILM COATED ORAL 4 TIMES DAILY
Qty: 21 TABLET | Refills: 0 | Status: SHIPPED | OUTPATIENT
Start: 2023-05-11 | End: 2023-05-21

## 2023-05-11 RX ORDER — METHOCARBAMOL 750 MG/1
750 TABLET, FILM COATED ORAL 3 TIMES DAILY
COMMUNITY
End: 2023-05-11

## 2023-05-11 SDOH — ECONOMIC STABILITY: HOUSING INSECURITY
IN THE LAST 12 MONTHS, WAS THERE A TIME WHEN YOU DID NOT HAVE A STEADY PLACE TO SLEEP OR SLEPT IN A SHELTER (INCLUDING NOW)?: NO

## 2023-05-11 SDOH — ECONOMIC STABILITY: FOOD INSECURITY: WITHIN THE PAST 12 MONTHS, THE FOOD YOU BOUGHT JUST DIDN'T LAST AND YOU DIDN'T HAVE MONEY TO GET MORE.: NEVER TRUE

## 2023-05-11 SDOH — ECONOMIC STABILITY: FOOD INSECURITY: WITHIN THE PAST 12 MONTHS, YOU WORRIED THAT YOUR FOOD WOULD RUN OUT BEFORE YOU GOT MONEY TO BUY MORE.: NEVER TRUE

## 2023-05-11 SDOH — ECONOMIC STABILITY: INCOME INSECURITY: HOW HARD IS IT FOR YOU TO PAY FOR THE VERY BASICS LIKE FOOD, HOUSING, MEDICAL CARE, AND HEATING?: NOT HARD AT ALL

## 2023-05-11 ASSESSMENT — ENCOUNTER SYMPTOMS
RESPIRATORY NEGATIVE: 1
GASTROINTESTINAL NEGATIVE: 1
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
## 2023-05-11 NOTE — TELEPHONE ENCOUNTER
Reason for call:  Spoke with [facility] from     Jaimie Doty [id number] - [phone] - F [phone]    She need clarification on Medication Methocarbamol 750 mg       Is this a new problem: yes,       Date of last appointment:  5/11/2023     Can we respond via [facility]: No    Best call back number: [phone] - F [phone]
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
## 2023-05-25 RX ORDER — ROSUVASTATIN CALCIUM 5 MG/1
TABLET, COATED ORAL
Qty: 90 TABLET | Refills: 1 | Status: SHIPPED | OUTPATIENT
Start: 2023-05-25

## 2023-05-25 NOTE — TELEPHONE ENCOUNTER
Medication Refill Request    Jackson Rae is requesting a refill of the following medication(s):     rosuvastatin (CRESTOR) 5 MG tablet        Please send refill to:   [facility] [id number] - [address], [address] - F [phone]

## 2023-06-08 ENCOUNTER — TELEPHONE (OUTPATIENT)
Age: 68
End: 2023-06-08

## 2023-06-08 DIAGNOSIS — I10 ESSENTIAL (PRIMARY) HYPERTENSION: Primary | ICD-10-CM

## 2023-06-08 RX ORDER — IRBESARTAN 75 MG/1
75 TABLET ORAL DAILY
Qty: 30 TABLET | Refills: 0 | Status: SHIPPED | OUTPATIENT
Start: 2023-06-08

## 2023-06-08 NOTE — TELEPHONE ENCOUNTER
Medication Refill Request    Mirnafarzad Sidra is requesting a refill of the following medication(s):     irbesartan (AVAPRO) 75 MG tablet    Please send refill to:      [facility] [id number] - [address], [address] - f [phone]

## 2023-06-21 ENCOUNTER — TELEPHONE (OUTPATIENT)
Age: 68
End: 2023-06-21

## 2023-06-21 RX ORDER — CITALOPRAM 10 MG/1
10 TABLET ORAL DAILY
Qty: 90 TABLET | Refills: 1 | Status: SHIPPED | OUTPATIENT
Start: 2023-06-21

## 2023-06-21 NOTE — TELEPHONE ENCOUNTER
Medication Refill Request    Shanika Lunsford is requesting a refill of the following medication(s):   citalopram (CELEXA) 10 MG tablet            Please send refill to:      [facility] [id number] - [address] [phone] 15

## 2023-09-22 ENCOUNTER — OFFICE VISIT (OUTPATIENT)
Age: 68
End: 2023-09-22
Payer: MEDICARE

## 2023-09-22 VITALS
SYSTOLIC BLOOD PRESSURE: 99 MMHG | WEIGHT: 112 LBS | OXYGEN SATURATION: 99 % | TEMPERATURE: 98.1 F | DIASTOLIC BLOOD PRESSURE: 61 MMHG | HEART RATE: 74 BPM | HEIGHT: 60 IN | BODY MASS INDEX: 21.99 KG/M2 | RESPIRATION RATE: 16 BRPM

## 2023-09-22 DIAGNOSIS — H81.11 BENIGN PAROXYSMAL POSITIONAL VERTIGO OF RIGHT EAR: Primary | ICD-10-CM

## 2023-09-22 PROCEDURE — 1123F ACP DISCUSS/DSCN MKR DOCD: CPT | Performed by: INTERNAL MEDICINE

## 2023-09-22 PROCEDURE — G8420 CALC BMI NORM PARAMETERS: HCPCS | Performed by: INTERNAL MEDICINE

## 2023-09-22 PROCEDURE — 99213 OFFICE O/P EST LOW 20 MIN: CPT | Performed by: INTERNAL MEDICINE

## 2023-09-22 PROCEDURE — 1036F TOBACCO NON-USER: CPT | Performed by: INTERNAL MEDICINE

## 2023-09-22 PROCEDURE — 3017F COLORECTAL CA SCREEN DOC REV: CPT | Performed by: INTERNAL MEDICINE

## 2023-09-22 PROCEDURE — 1090F PRES/ABSN URINE INCON ASSESS: CPT | Performed by: INTERNAL MEDICINE

## 2023-09-22 PROCEDURE — 3078F DIAST BP <80 MM HG: CPT | Performed by: INTERNAL MEDICINE

## 2023-09-22 PROCEDURE — 3074F SYST BP LT 130 MM HG: CPT | Performed by: INTERNAL MEDICINE

## 2023-09-22 PROCEDURE — G8427 DOCREV CUR MEDS BY ELIG CLIN: HCPCS | Performed by: INTERNAL MEDICINE

## 2023-09-22 PROCEDURE — G8399 PT W/DXA RESULTS DOCUMENT: HCPCS | Performed by: INTERNAL MEDICINE

## 2023-09-22 RX ORDER — CHLORAL HYDRATE 500 MG
CAPSULE ORAL
COMMUNITY
Start: 2020-08-03

## 2023-09-22 RX ORDER — VITAMIN B COMPLEX
TABLET ORAL DAILY
COMMUNITY

## 2023-09-24 PROBLEM — Z92.89 H/O BONE DENSITY STUDY: Chronic | Status: ACTIVE | Noted: 2023-04-17

## 2023-10-03 ENCOUNTER — OFFICE VISIT (OUTPATIENT)
Age: 68
End: 2023-10-03
Payer: MEDICARE

## 2023-10-03 VITALS
RESPIRATION RATE: 16 BRPM | HEART RATE: 98 BPM | DIASTOLIC BLOOD PRESSURE: 60 MMHG | BODY MASS INDEX: 21.79 KG/M2 | OXYGEN SATURATION: 98 % | SYSTOLIC BLOOD PRESSURE: 96 MMHG | TEMPERATURE: 97.3 F | HEIGHT: 60 IN | WEIGHT: 111 LBS

## 2023-10-03 DIAGNOSIS — F32.0 MAJOR DEPRESSIVE DISORDER, SINGLE EPISODE, MILD (HCC): ICD-10-CM

## 2023-10-03 DIAGNOSIS — E78.2 MIXED HYPERLIPIDEMIA: ICD-10-CM

## 2023-10-03 DIAGNOSIS — H81.11 BENIGN PAROXYSMAL POSITIONAL VERTIGO OF RIGHT EAR: ICD-10-CM

## 2023-10-03 DIAGNOSIS — J30.2 SEASONAL ALLERGIES: ICD-10-CM

## 2023-10-03 DIAGNOSIS — Z79.899 ENCOUNTER FOR LONG-TERM (CURRENT) USE OF MEDICATIONS: ICD-10-CM

## 2023-10-03 DIAGNOSIS — I10 BENIGN HYPERTENSION: Primary | ICD-10-CM

## 2023-10-03 PROCEDURE — 3074F SYST BP LT 130 MM HG: CPT | Performed by: INTERNAL MEDICINE

## 2023-10-03 PROCEDURE — G8399 PT W/DXA RESULTS DOCUMENT: HCPCS | Performed by: INTERNAL MEDICINE

## 2023-10-03 PROCEDURE — 99214 OFFICE O/P EST MOD 30 MIN: CPT | Performed by: INTERNAL MEDICINE

## 2023-10-03 PROCEDURE — 3017F COLORECTAL CA SCREEN DOC REV: CPT | Performed by: INTERNAL MEDICINE

## 2023-10-03 PROCEDURE — G8484 FLU IMMUNIZE NO ADMIN: HCPCS | Performed by: INTERNAL MEDICINE

## 2023-10-03 PROCEDURE — 1090F PRES/ABSN URINE INCON ASSESS: CPT | Performed by: INTERNAL MEDICINE

## 2023-10-03 PROCEDURE — G8427 DOCREV CUR MEDS BY ELIG CLIN: HCPCS | Performed by: INTERNAL MEDICINE

## 2023-10-03 PROCEDURE — 1036F TOBACCO NON-USER: CPT | Performed by: INTERNAL MEDICINE

## 2023-10-03 PROCEDURE — G8420 CALC BMI NORM PARAMETERS: HCPCS | Performed by: INTERNAL MEDICINE

## 2023-10-03 PROCEDURE — 1123F ACP DISCUSS/DSCN MKR DOCD: CPT | Performed by: INTERNAL MEDICINE

## 2023-10-03 PROCEDURE — 3078F DIAST BP <80 MM HG: CPT | Performed by: INTERNAL MEDICINE

## 2023-11-17 ENCOUNTER — HOSPITAL ENCOUNTER (OUTPATIENT)
Facility: HOSPITAL | Age: 68
End: 2023-11-17
Attending: SPECIALIST
Payer: MEDICARE

## 2023-11-17 DIAGNOSIS — H90.3 SENSORINEURAL HEARING LOSS, BILATERAL: ICD-10-CM

## 2023-11-17 DIAGNOSIS — J35.8 TONSILLAR MASS: ICD-10-CM

## 2023-11-17 DIAGNOSIS — H81.11 BENIGN PAROXYSMAL VERTIGO OF RIGHT EAR: ICD-10-CM

## 2023-11-17 LAB — CREAT BLD-MCNC: 0.8 MG/DL (ref 0.6–1.3)

## 2023-11-17 PROCEDURE — 6360000004 HC RX CONTRAST MEDICATION: Performed by: SPECIALIST

## 2023-11-17 PROCEDURE — 82565 ASSAY OF CREATININE: CPT

## 2023-11-17 PROCEDURE — 70491 CT SOFT TISSUE NECK W/DYE: CPT

## 2023-11-17 RX ADMIN — IOPAMIDOL 100 ML: 755 INJECTION, SOLUTION INTRAVENOUS at 09:36

## 2023-12-07 RX ORDER — CITALOPRAM HYDROBROMIDE 10 MG/1
10 TABLET ORAL DAILY
Qty: 90 TABLET | Refills: 1 | Status: SHIPPED | OUTPATIENT
Start: 2023-12-07

## 2023-12-14 DIAGNOSIS — I10 ESSENTIAL (PRIMARY) HYPERTENSION: ICD-10-CM

## 2023-12-14 NOTE — TELEPHONE ENCOUNTER
Medication Refill Request    Lou Moss is requesting a refill of the following medication(s):   Ibersartan 75 mg 90day supply  Please send refill to:        [facility] [id number] - [address] [place] [address] [facility] [phone] (Pharmacy) [phone]

## 2023-12-15 RX ORDER — IRBESARTAN 75 MG/1
75 TABLET ORAL DAILY
Qty: 90 TABLET | Refills: 0 | Status: SHIPPED | OUTPATIENT
Start: 2023-12-15

## 2024-01-17 NOTE — PROGRESS NOTES
Assessment/Plan:     1. Essential (primary) hypertension  -bp low with use of irbesartan at 37.5mg daily.  Bp normal at home without use of any medication.   -advised for her to stop use of her irbesartan. Continue to monitor bp and bring in cuff and her readings to her next visit.     - CBC with Auto Differential; Future  - Comprehensive Metabolic Panel; Future  - Lipid Panel; Future  - Urinalysis with Reflex to Culture; Future  - TSH; Future    2. Mixed hyperlipidemia  -taking rosuvastatin 5mg daily.  -need fasting lipid panel     - CBC with Auto Differential; Future  - Comprehensive Metabolic Panel; Future  - Lipid Panel; Future    3. Encounter for long-term (current) use of medications      4. Dermatitis  -skin around distal joint of left middle finger very dry and excoriated.   -likely allergic dermatitis to either the neosporin or the adhesive on the bandaid.  -recommended use of hydrocortisone cream bid and also a moisturizer daily on area for next week.       5. Herpes zoster without complication  -recurrent. Improving  -if frequency of recurrence increases, may need daily antiviral for suppression.     Orders Placed This Encounter    CBC with Auto Differential     Standing Status:   Future     Standing Expiration Date:   1/19/2025    Comprehensive Metabolic Panel     Standing Status:   Future     Standing Expiration Date:   1/19/2025    Lipid Panel     Standing Status:   Future     Standing Expiration Date:   1/19/2025     Order Specific Question:   Is Patient Fasting?/# of Hours     Answer:   8     Order Specific Question:   Has the patient fasted?     Answer:   Yes    Urinalysis with Reflex to Culture     Standing Status:   Future     Standing Expiration Date:   1/19/2025     Order Specific Question:   SPECIFY(EX-CATH,MIDSTREAM,CYSTO,ETC)?     Answer:   midstream    TSH     Standing Status:   Future     Standing Expiration Date:   1/19/2025         Follow-up Disposition:     Return in about 3 months

## 2024-01-19 ENCOUNTER — OFFICE VISIT (OUTPATIENT)
Age: 69
End: 2024-01-19
Payer: MEDICARE

## 2024-01-19 VITALS
DIASTOLIC BLOOD PRESSURE: 70 MMHG | OXYGEN SATURATION: 98 % | BODY MASS INDEX: 21.79 KG/M2 | HEART RATE: 89 BPM | SYSTOLIC BLOOD PRESSURE: 100 MMHG | WEIGHT: 111 LBS | RESPIRATION RATE: 16 BRPM | HEIGHT: 60 IN | TEMPERATURE: 98.4 F

## 2024-01-19 DIAGNOSIS — L30.9 DERMATITIS: ICD-10-CM

## 2024-01-19 DIAGNOSIS — I10 ESSENTIAL (PRIMARY) HYPERTENSION: Primary | ICD-10-CM

## 2024-01-19 DIAGNOSIS — E78.2 MIXED HYPERLIPIDEMIA: ICD-10-CM

## 2024-01-19 DIAGNOSIS — B02.9 HERPES ZOSTER WITHOUT COMPLICATION: ICD-10-CM

## 2024-01-19 DIAGNOSIS — Z79.899 ENCOUNTER FOR LONG-TERM (CURRENT) USE OF MEDICATIONS: ICD-10-CM

## 2024-01-19 PROCEDURE — G8420 CALC BMI NORM PARAMETERS: HCPCS | Performed by: INTERNAL MEDICINE

## 2024-01-19 PROCEDURE — 3078F DIAST BP <80 MM HG: CPT | Performed by: INTERNAL MEDICINE

## 2024-01-19 PROCEDURE — 1036F TOBACCO NON-USER: CPT | Performed by: INTERNAL MEDICINE

## 2024-01-19 PROCEDURE — G8399 PT W/DXA RESULTS DOCUMENT: HCPCS | Performed by: INTERNAL MEDICINE

## 2024-01-19 PROCEDURE — G8428 CUR MEDS NOT DOCUMENT: HCPCS | Performed by: INTERNAL MEDICINE

## 2024-01-19 PROCEDURE — 3074F SYST BP LT 130 MM HG: CPT | Performed by: INTERNAL MEDICINE

## 2024-01-19 PROCEDURE — 3017F COLORECTAL CA SCREEN DOC REV: CPT | Performed by: INTERNAL MEDICINE

## 2024-01-19 PROCEDURE — 99214 OFFICE O/P EST MOD 30 MIN: CPT | Performed by: INTERNAL MEDICINE

## 2024-01-19 PROCEDURE — 1123F ACP DISCUSS/DSCN MKR DOCD: CPT | Performed by: INTERNAL MEDICINE

## 2024-01-19 PROCEDURE — 1090F PRES/ABSN URINE INCON ASSESS: CPT | Performed by: INTERNAL MEDICINE

## 2024-01-19 PROCEDURE — G8484 FLU IMMUNIZE NO ADMIN: HCPCS | Performed by: INTERNAL MEDICINE

## 2024-01-19 ASSESSMENT — PATIENT HEALTH QUESTIONNAIRE - PHQ9
8. MOVING OR SPEAKING SO SLOWLY THAT OTHER PEOPLE COULD HAVE NOTICED. OR THE OPPOSITE, BEING SO FIGETY OR RESTLESS THAT YOU HAVE BEEN MOVING AROUND A LOT MORE THAN USUAL: 0
1. LITTLE INTEREST OR PLEASURE IN DOING THINGS: 0
7. TROUBLE CONCENTRATING ON THINGS, SUCH AS READING THE NEWSPAPER OR WATCHING TELEVISION: 0
SUM OF ALL RESPONSES TO PHQ9 QUESTIONS 1 & 2: 0
SUM OF ALL RESPONSES TO PHQ QUESTIONS 1-9: 0
SUM OF ALL RESPONSES TO PHQ QUESTIONS 1-9: 0
3. TROUBLE FALLING OR STAYING ASLEEP: 0
SUM OF ALL RESPONSES TO PHQ QUESTIONS 1-9: 0
5. POOR APPETITE OR OVEREATING: 0
4. FEELING TIRED OR HAVING LITTLE ENERGY: 0
9. THOUGHTS THAT YOU WOULD BE BETTER OFF DEAD, OR OF HURTING YOURSELF: 0
SUM OF ALL RESPONSES TO PHQ QUESTIONS 1-9: 0
2. FEELING DOWN, DEPRESSED OR HOPELESS: 0
10. IF YOU CHECKED OFF ANY PROBLEMS, HOW DIFFICULT HAVE THESE PROBLEMS MADE IT FOR YOU TO DO YOUR WORK, TAKE CARE OF THINGS AT HOME, OR GET ALONG WITH OTHER PEOPLE: 0
6. FEELING BAD ABOUT YOURSELF - OR THAT YOU ARE A FAILURE OR HAVE LET YOURSELF OR YOUR FAMILY DOWN: 0

## 2024-01-19 NOTE — PROGRESS NOTES
Chief Complaint   Patient presents with    Medication Check     eviewed record in preparation for visit and have obtained necessary documentation.    Identified pt with two pt identifiers(name and ).      Health Maintenance Due   Topic    Respiratory Syncytial Virus (RSV) Pregnant or age 60 yrs+ (1 - 1-dose 60+ series)    Pneumococcal 65+ years Vaccine (1 - PCV)    COVID-19 Vaccine (3 - 2023-24 season)         Chief Complaint   Patient presents with    Medication Check        Wt Readings from Last 3 Encounters:   24 50.3 kg (111 lb)   10/03/23 50.3 kg (111 lb)   23 50.8 kg (112 lb)     Temp Readings from Last 3 Encounters:   24 98.4 °F (36.9 °C) (Temporal)   10/03/23 97.3 °F (36.3 °C) (Temporal)   23 98.1 °F (36.7 °C) (Temporal)     BP Readings from Last 3 Encounters:   24 100/70   10/03/23 96/60   23 99/61     Pulse Readings from Last 3 Encounters:   24 89   10/03/23 98   23 74           Learning Assessment:  :        Depression Screening:  :         No data to display                Fall Risk Assessment:  :        Abuse Screening:  :         No data to display                Coordination of Care Questionnaire:  :    1) Have you been to an emergency room, urgent care clinic since your last visit? no   Hospitalized since your last visit? no             2) Have you seen or consulted any other health care providers outside of [facility] since your last visit? yes  (Include any pap smears or colon screenings in this section.)    3) Do you have an Advance Directive on file? no    4) Are you interested in receiving information on Advance Directives? No

## 2024-03-11 DIAGNOSIS — I10 ESSENTIAL (PRIMARY) HYPERTENSION: ICD-10-CM

## 2024-03-11 DIAGNOSIS — E78.2 MIXED HYPERLIPIDEMIA: ICD-10-CM

## 2024-03-12 LAB
ALBUMIN SERPL-MCNC: 4.4 G/DL (ref 3.5–5)
ALBUMIN/GLOB SERPL: 1.3 (ref 1.1–2.2)
ALP SERPL-CCNC: 57 U/L (ref 45–117)
ALT SERPL-CCNC: 37 U/L (ref 12–78)
ANION GAP SERPL CALC-SCNC: 4 MMOL/L (ref 5–15)
APPEARANCE UR: ABNORMAL
AST SERPL-CCNC: 23 U/L (ref 15–37)
BACTERIA URNS QL MICRO: NEGATIVE /HPF
BASOPHILS # BLD: 0.1 K/UL (ref 0–0.1)
BASOPHILS NFR BLD: 1 % (ref 0–1)
BILIRUB SERPL-MCNC: 0.6 MG/DL (ref 0.2–1)
BILIRUB UR QL: NEGATIVE
BUN SERPL-MCNC: 12 MG/DL (ref 6–20)
BUN/CREAT SERPL: 14 (ref 12–20)
CALCIUM SERPL-MCNC: 9.5 MG/DL (ref 8.5–10.1)
CAOX CRY URNS QL MICRO: ABNORMAL
CHLORIDE SERPL-SCNC: 108 MMOL/L (ref 97–108)
CHOLEST SERPL-MCNC: 215 MG/DL
CO2 SERPL-SCNC: 30 MMOL/L (ref 21–32)
COLOR UR: ABNORMAL
CREAT SERPL-MCNC: 0.86 MG/DL (ref 0.55–1.02)
DIFFERENTIAL METHOD BLD: ABNORMAL
EOSINOPHIL # BLD: 0.1 K/UL (ref 0–0.4)
EOSINOPHIL NFR BLD: 3 % (ref 0–7)
EPITH CASTS URNS QL MICRO: ABNORMAL /LPF
ERYTHROCYTE [DISTWIDTH] IN BLOOD BY AUTOMATED COUNT: 13.4 % (ref 11.5–14.5)
GLOBULIN SER CALC-MCNC: 3.3 G/DL (ref 2–4)
GLUCOSE SERPL-MCNC: 91 MG/DL (ref 65–100)
GLUCOSE UR STRIP.AUTO-MCNC: NEGATIVE MG/DL
HCT VFR BLD AUTO: 36.8 % (ref 35–47)
HDLC SERPL-MCNC: 94 MG/DL
HDLC SERPL: 2.3 (ref 0–5)
HGB BLD-MCNC: 12 G/DL (ref 11.5–16)
HGB UR QL STRIP: NEGATIVE
IMM GRANULOCYTES # BLD AUTO: 0 K/UL (ref 0–0.04)
IMM GRANULOCYTES NFR BLD AUTO: 0 % (ref 0–0.5)
KETONES UR QL STRIP.AUTO: ABNORMAL MG/DL
LDLC SERPL CALC-MCNC: 102 MG/DL (ref 0–100)
LEUKOCYTE ESTERASE UR QL STRIP.AUTO: NEGATIVE
LYMPHOCYTES # BLD: 2.2 K/UL (ref 0.8–3.5)
LYMPHOCYTES NFR BLD: 50 % (ref 12–49)
MCH RBC QN AUTO: 30.8 PG (ref 26–34)
MCHC RBC AUTO-ENTMCNC: 32.6 G/DL (ref 30–36.5)
MCV RBC AUTO: 94.4 FL (ref 80–99)
MONOCYTES # BLD: 0.5 K/UL (ref 0–1)
MONOCYTES NFR BLD: 10 % (ref 5–13)
NEUTS SEG # BLD: 1.6 K/UL (ref 1.8–8)
NEUTS SEG NFR BLD: 36 % (ref 32–75)
NITRITE UR QL STRIP.AUTO: NEGATIVE
NRBC # BLD: 0 K/UL (ref 0–0.01)
NRBC BLD-RTO: 0 PER 100 WBC
PH UR STRIP: 5.5 (ref 5–8)
PLATELET # BLD AUTO: 237 K/UL (ref 150–400)
PMV BLD AUTO: 9.8 FL (ref 8.9–12.9)
POTASSIUM SERPL-SCNC: 3.4 MMOL/L (ref 3.5–5.1)
PROT SERPL-MCNC: 7.7 G/DL (ref 6.4–8.2)
PROT UR STRIP-MCNC: NEGATIVE MG/DL
RBC # BLD AUTO: 3.9 M/UL (ref 3.8–5.2)
RBC #/AREA URNS HPF: ABNORMAL /HPF (ref 0–5)
SODIUM SERPL-SCNC: 142 MMOL/L (ref 136–145)
SP GR UR REFRACTOMETRY: 1.02 (ref 1–1.03)
TRIGL SERPL-MCNC: 95 MG/DL
TSH SERPL DL<=0.05 MIU/L-ACNC: 2.39 UIU/ML (ref 0.36–3.74)
URINE CULTURE IF INDICATED: ABNORMAL
UROBILINOGEN UR QL STRIP.AUTO: 0.2 EU/DL (ref 0.2–1)
VLDLC SERPL CALC-MCNC: 19 MG/DL
WBC # BLD AUTO: 4.5 K/UL (ref 3.6–11)
WBC URNS QL MICRO: ABNORMAL /HPF (ref 0–4)

## 2024-04-23 SDOH — HEALTH STABILITY: PHYSICAL HEALTH: ON AVERAGE, HOW MANY DAYS PER WEEK DO YOU ENGAGE IN MODERATE TO STRENUOUS EXERCISE (LIKE A BRISK WALK)?: 4 DAYS

## 2024-04-23 ASSESSMENT — LIFESTYLE VARIABLES
HOW OFTEN DO YOU HAVE SIX OR MORE DRINKS ON ONE OCCASION: 1
HOW MANY STANDARD DRINKS CONTAINING ALCOHOL DO YOU HAVE ON A TYPICAL DAY: 0
HOW OFTEN DO YOU HAVE A DRINK CONTAINING ALCOHOL: 1
HOW MANY STANDARD DRINKS CONTAINING ALCOHOL DO YOU HAVE ON A TYPICAL DAY: PATIENT DOES NOT DRINK
HOW OFTEN DO YOU HAVE A DRINK CONTAINING ALCOHOL: NEVER

## 2024-04-23 ASSESSMENT — PATIENT HEALTH QUESTIONNAIRE - PHQ9
SUM OF ALL RESPONSES TO PHQ9 QUESTIONS 1 & 2: 0
SUM OF ALL RESPONSES TO PHQ QUESTIONS 1-9: 0
2. FEELING DOWN, DEPRESSED OR HOPELESS: NOT AT ALL
1. LITTLE INTEREST OR PLEASURE IN DOING THINGS: NOT AT ALL
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
## 2024-04-25 ENCOUNTER — OFFICE VISIT (OUTPATIENT)
Age: 69
End: 2024-04-25
Payer: MEDICARE

## 2024-04-25 VITALS
HEART RATE: 104 BPM | HEIGHT: 60 IN | TEMPERATURE: 97.6 F | BODY MASS INDEX: 21.68 KG/M2 | SYSTOLIC BLOOD PRESSURE: 110 MMHG | DIASTOLIC BLOOD PRESSURE: 84 MMHG | RESPIRATION RATE: 16 BRPM | WEIGHT: 110.4 LBS | OXYGEN SATURATION: 99 %

## 2024-04-25 DIAGNOSIS — E78.2 MIXED HYPERLIPIDEMIA: ICD-10-CM

## 2024-04-25 DIAGNOSIS — D32.9 MENINGIOMA (HCC): ICD-10-CM

## 2024-04-25 DIAGNOSIS — Z12.31 ENCOUNTER FOR SCREENING MAMMOGRAM FOR BREAST CANCER: ICD-10-CM

## 2024-04-25 DIAGNOSIS — F32.0 MAJOR DEPRESSIVE DISORDER, SINGLE EPISODE, MILD (HCC): ICD-10-CM

## 2024-04-25 DIAGNOSIS — Z78.0 MENOPAUSE: ICD-10-CM

## 2024-04-25 DIAGNOSIS — Z79.899 ENCOUNTER FOR LONG-TERM (CURRENT) USE OF MEDICATIONS: ICD-10-CM

## 2024-04-25 DIAGNOSIS — E87.6 HYPOKALEMIA: ICD-10-CM

## 2024-04-25 DIAGNOSIS — Z00.00 MEDICARE ANNUAL WELLNESS VISIT, SUBSEQUENT: Primary | ICD-10-CM

## 2024-04-25 DIAGNOSIS — I10 ESSENTIAL (PRIMARY) HYPERTENSION: ICD-10-CM

## 2024-04-25 LAB
ANION GAP SERPL CALC-SCNC: 8 MMOL/L (ref 5–15)
BUN SERPL-MCNC: 15 MG/DL (ref 6–20)
BUN/CREAT SERPL: 19 (ref 12–20)
CALCIUM SERPL-MCNC: 9.8 MG/DL (ref 8.5–10.1)
CHLORIDE SERPL-SCNC: 105 MMOL/L (ref 97–108)
CO2 SERPL-SCNC: 26 MMOL/L (ref 21–32)
CREAT SERPL-MCNC: 0.81 MG/DL (ref 0.55–1.02)
GLUCOSE SERPL-MCNC: 100 MG/DL (ref 65–100)
POTASSIUM SERPL-SCNC: 3.8 MMOL/L (ref 3.5–5.1)
SODIUM SERPL-SCNC: 139 MMOL/L (ref 136–145)

## 2024-04-25 PROCEDURE — G0439 PPPS, SUBSEQ VISIT: HCPCS | Performed by: INTERNAL MEDICINE

## 2024-04-25 PROCEDURE — 3017F COLORECTAL CA SCREEN DOC REV: CPT | Performed by: INTERNAL MEDICINE

## 2024-04-25 PROCEDURE — 99213 OFFICE O/P EST LOW 20 MIN: CPT | Performed by: INTERNAL MEDICINE

## 2024-04-25 PROCEDURE — 3074F SYST BP LT 130 MM HG: CPT | Performed by: INTERNAL MEDICINE

## 2024-04-25 PROCEDURE — 1036F TOBACCO NON-USER: CPT | Performed by: INTERNAL MEDICINE

## 2024-04-25 PROCEDURE — G8420 CALC BMI NORM PARAMETERS: HCPCS | Performed by: INTERNAL MEDICINE

## 2024-04-25 PROCEDURE — 3079F DIAST BP 80-89 MM HG: CPT | Performed by: INTERNAL MEDICINE

## 2024-04-25 PROCEDURE — G8399 PT W/DXA RESULTS DOCUMENT: HCPCS | Performed by: INTERNAL MEDICINE

## 2024-04-25 PROCEDURE — G8427 DOCREV CUR MEDS BY ELIG CLIN: HCPCS | Performed by: INTERNAL MEDICINE

## 2024-04-25 PROCEDURE — 1123F ACP DISCUSS/DSCN MKR DOCD: CPT | Performed by: INTERNAL MEDICINE

## 2024-04-25 PROCEDURE — 1090F PRES/ABSN URINE INCON ASSESS: CPT | Performed by: INTERNAL MEDICINE

## 2024-04-25 RX ORDER — DIAPER,BRIEF,ADULT, DISPOSABLE
EACH MISCELLANEOUS
COMMUNITY
Start: 2018-08-01

## 2024-04-25 RX ORDER — VITAMIN E 268 MG
CAPSULE ORAL
COMMUNITY
Start: 2023-06-01

## 2024-04-25 ASSESSMENT — PATIENT HEALTH QUESTIONNAIRE - PHQ9
SUM OF ALL RESPONSES TO PHQ QUESTIONS 1-9: 0
2. FEELING DOWN, DEPRESSED OR HOPELESS: NOT AT ALL
SUM OF ALL RESPONSES TO PHQ QUESTIONS 1-9: 0
6. FEELING BAD ABOUT YOURSELF - OR THAT YOU ARE A FAILURE OR HAVE LET YOURSELF OR YOUR FAMILY DOWN: NOT AT ALL
SUM OF ALL RESPONSES TO PHQ9 QUESTIONS 1 & 2: 0
9. THOUGHTS THAT YOU WOULD BE BETTER OFF DEAD, OR OF HURTING YOURSELF: NOT AT ALL
8. MOVING OR SPEAKING SO SLOWLY THAT OTHER PEOPLE COULD HAVE NOTICED. OR THE OPPOSITE, BEING SO FIGETY OR RESTLESS THAT YOU HAVE BEEN MOVING AROUND A LOT MORE THAN USUAL: NOT AT ALL
10. IF YOU CHECKED OFF ANY PROBLEMS, HOW DIFFICULT HAVE THESE PROBLEMS MADE IT FOR YOU TO DO YOUR WORK, TAKE CARE OF THINGS AT HOME, OR GET ALONG WITH OTHER PEOPLE: NOT DIFFICULT AT ALL
SUM OF ALL RESPONSES TO PHQ QUESTIONS 1-9: 0
1. LITTLE INTEREST OR PLEASURE IN DOING THINGS: NOT AT ALL
7. TROUBLE CONCENTRATING ON THINGS, SUCH AS READING THE NEWSPAPER OR WATCHING TELEVISION: NOT AT ALL
5. POOR APPETITE OR OVEREATING: NOT AT ALL
SUM OF ALL RESPONSES TO PHQ QUESTIONS 1-9: 0
4. FEELING TIRED OR HAVING LITTLE ENERGY: NOT AT ALL
3. TROUBLE FALLING OR STAYING ASLEEP: NOT AT ALL

## 2024-04-25 NOTE — PROGRESS NOTES
Chief Complaint   Patient presents with    Medicare AWV     eviewed record in preparation for visit and have obtained necessary documentation.    Identified pt with two pt identifiers(name and ).      Health Maintenance Due   Topic    Respiratory Syncytial Virus (RSV) Pregnant or age 60 yrs+ (1 - 1-dose 60+ series)    Pneumococcal 65+ years Vaccine (1 of 1 - PCV)    COVID-19 Vaccine (3 - 2023-24 season)    Annual Wellness Visit (Medicare)          Chief Complaint   Patient presents with    Medicare AWV        Wt Readings from Last 3 Encounters:   24 50.1 kg (110 lb 6.4 oz)   24 50.3 kg (111 lb)   10/03/23 50.3 kg (111 lb)     Temp Readings from Last 3 Encounters:   24 97.6 °F (36.4 °C) (Temporal)   24 98.4 °F (36.9 °C) (Temporal)   10/03/23 97.3 °F (36.3 °C) (Temporal)     BP Readings from Last 3 Encounters:   24 110/84   24 100/70   10/03/23 96/60     Pulse Readings from Last 3 Encounters:   24 (!) 104   24 89   10/03/23 98           Learning Assessment:  :    Failed to redirect to the Timeline version of the [employer].    Depression Screening:  :         No data to display                Fall Risk Assessment:  :    Failed to redirect to the Timeline version of the [employer].    Abuse Screening:  :         No data to display                Coordination of Care Questionnaire:  :    1) Have you been to an emergency room, urgent care clinic since your last visit? no   Hospitalized since your last visit? no             2) Have you seen or consulted any other health care providers outside of [facility] since your last visit? no  (Include any pap smears or colon screenings in this section.)    3) Do you have an Advance Directive on file? no    4) Are you interested in receiving information on Advance Directives? No

## 2024-05-08 ENCOUNTER — HOSPITAL ENCOUNTER (OUTPATIENT)
Age: 69
Discharge: HOME | End: 2024-05-11
Payer: MEDICARE

## 2024-05-08 VITALS — HEIGHT: 60 IN | BODY MASS INDEX: 21.79 KG/M2 | WEIGHT: 111 LBS

## 2024-05-08 DIAGNOSIS — D32.9 MENINGIOMA (HCC): ICD-10-CM

## 2024-05-08 DIAGNOSIS — Z12.31 ENCOUNTER FOR SCREENING MAMMOGRAM FOR BREAST CANCER: ICD-10-CM

## 2024-05-08 PROCEDURE — 6360000004 HC RX CONTRAST MEDICATION: Performed by: RADIOLOGY

## 2024-05-08 PROCEDURE — A9579 GAD-BASE MR CONTRAST NOS,1ML: HCPCS | Performed by: RADIOLOGY

## 2024-05-08 PROCEDURE — 77067 SCR MAMMO BI INCL CAD: CPT

## 2024-05-08 PROCEDURE — 70553 MRI BRAIN STEM W/O & W/DYE: CPT

## 2024-05-08 RX ADMIN — GADOTERIDOL 10 ML: 279.3 INJECTION, SOLUTION INTRAVENOUS at 10:26

## 2024-05-10 PROBLEM — D32.9 MENINGIOMA (HCC): Status: ACTIVE | Noted: 2024-05-10

## 2024-06-12 RX ORDER — CITALOPRAM HYDROBROMIDE 10 MG/1
10 TABLET ORAL DAILY
Qty: 90 TABLET | Refills: 0 | Status: SHIPPED | OUTPATIENT
Start: 2024-06-12

## 2024-06-12 NOTE — TELEPHONE ENCOUNTER
Rx sent to pharmacy as previously filled and verified by Verbal Order Read Back with provider.    NV 10/29/2024

## 2024-06-25 RX ORDER — ROSUVASTATIN CALCIUM 5 MG/1
TABLET, COATED ORAL
Qty: 90 TABLET | Refills: 1 | Status: SHIPPED | OUTPATIENT
Start: 2024-06-25

## 2024-06-25 NOTE — TELEPHONE ENCOUNTER
Last office visit 4/25/24  NEXT APPT  With Internal Medicine (Antonina Rogers MD)  10/29/2024 at 10:20 AM    Last fill on cresto 5/25/23 # 90 with 1 additional refill

## 2024-06-25 NOTE — TELEPHONE ENCOUNTER
Future Appointments   Date Time Provider Department Center   10/29/2024 10:20 AM Antonina Rogers MD [facility]

## 2024-07-25 ENCOUNTER — ANESTHESIA EVENT (OUTPATIENT)
Facility: HOSPITAL | Age: 69
End: 2024-07-25
Payer: MEDICARE

## 2024-07-25 ENCOUNTER — ANESTHESIA (OUTPATIENT)
Facility: HOSPITAL | Age: 69
End: 2024-07-25
Payer: MEDICARE

## 2024-07-25 ENCOUNTER — HOSPITAL ENCOUNTER (OUTPATIENT)
Facility: HOSPITAL | Age: 69
Setting detail: OUTPATIENT SURGERY
Discharge: HOME | End: 2024-07-25
Attending: INTERNAL MEDICINE | Admitting: INTERNAL MEDICINE
Payer: MEDICARE

## 2024-07-25 VITALS
SYSTOLIC BLOOD PRESSURE: 115 MMHG | BODY MASS INDEX: 21.55 KG/M2 | HEART RATE: 76 BPM | RESPIRATION RATE: 24 BRPM | WEIGHT: 109.8 LBS | OXYGEN SATURATION: 98 % | DIASTOLIC BLOOD PRESSURE: 75 MMHG | HEIGHT: 60 IN

## 2024-07-25 PROCEDURE — 3600007512: Performed by: INTERNAL MEDICINE

## 2024-07-25 PROCEDURE — 3700000000 HC ANESTHESIA ATTENDED CARE: Performed by: INTERNAL MEDICINE

## 2024-07-25 PROCEDURE — 7100000010 HC PHASE II RECOVERY - FIRST 15 MIN: Performed by: INTERNAL MEDICINE

## 2024-07-25 PROCEDURE — 2580000003 HC RX 258: Performed by: INTERNAL MEDICINE

## 2024-07-25 PROCEDURE — 6360000002 HC RX W HCPCS: Performed by: NURSE ANESTHETIST, CERTIFIED REGISTERED

## 2024-07-25 PROCEDURE — 3600007502: Performed by: INTERNAL MEDICINE

## 2024-07-25 PROCEDURE — 2500000003 HC RX 250 WO HCPCS: Performed by: NURSE ANESTHETIST, CERTIFIED REGISTERED

## 2024-07-25 PROCEDURE — 3700000001 HC ADD 15 MINUTES (ANESTHESIA): Performed by: INTERNAL MEDICINE

## 2024-07-25 PROCEDURE — 7100000011 HC PHASE II RECOVERY - ADDTL 15 MIN: Performed by: INTERNAL MEDICINE

## 2024-07-25 PROCEDURE — 2709999900 HC NON-CHARGEABLE SUPPLY: Performed by: INTERNAL MEDICINE

## 2024-07-25 RX ORDER — SODIUM CHLORIDE 0.9 % (FLUSH) 0.9 %
5-40 SYRINGE (ML) INJECTION PRN
Status: DISCONTINUED | OUTPATIENT
Start: 2024-07-25 | End: 2024-07-25 | Stop reason: HOSPADM

## 2024-07-25 RX ORDER — SODIUM CHLORIDE 0.9 % (FLUSH) 0.9 %
5-40 SYRINGE (ML) INJECTION EVERY 12 HOURS SCHEDULED
Status: DISCONTINUED | OUTPATIENT
Start: 2024-07-25 | End: 2024-07-25 | Stop reason: HOSPADM

## 2024-07-25 RX ORDER — SODIUM CHLORIDE 9 MG/ML
INJECTION, SOLUTION INTRAVENOUS CONTINUOUS
Status: CANCELLED | OUTPATIENT
Start: 2024-07-25

## 2024-07-25 RX ORDER — SODIUM CHLORIDE 9 MG/ML
INJECTION, SOLUTION INTRAVENOUS CONTINUOUS
Status: DISCONTINUED | OUTPATIENT
Start: 2024-07-25 | End: 2024-07-25 | Stop reason: HOSPADM

## 2024-07-25 RX ORDER — SODIUM CHLORIDE 9 MG/ML
25 INJECTION, SOLUTION INTRAVENOUS PRN
Status: DISCONTINUED | OUTPATIENT
Start: 2024-07-25 | End: 2024-07-25 | Stop reason: HOSPADM

## 2024-07-25 RX ORDER — SODIUM CHLORIDE 9 MG/ML
25 INJECTION, SOLUTION INTRAVENOUS PRN
Status: CANCELLED | OUTPATIENT
Start: 2024-07-25

## 2024-07-25 RX ORDER — SODIUM CHLORIDE 0.9 % (FLUSH) 0.9 %
5-40 SYRINGE (ML) INJECTION PRN
Status: CANCELLED | OUTPATIENT
Start: 2024-07-25

## 2024-07-25 RX ORDER — SODIUM CHLORIDE 0.9 % (FLUSH) 0.9 %
5-40 SYRINGE (ML) INJECTION EVERY 12 HOURS SCHEDULED
Status: CANCELLED | OUTPATIENT
Start: 2024-07-25

## 2024-07-25 RX ADMIN — SODIUM CHLORIDE: 9 INJECTION, SOLUTION INTRAVENOUS at 09:40

## 2024-07-25 RX ADMIN — PROPOFOL 70 MG: 10 INJECTION, EMULSION INTRAVENOUS at 09:56

## 2024-07-25 RX ADMIN — PROPOFOL 50 MG: 10 INJECTION, EMULSION INTRAVENOUS at 10:06

## 2024-07-25 RX ADMIN — PROPOFOL 30 MG: 10 INJECTION, EMULSION INTRAVENOUS at 09:58

## 2024-07-25 RX ADMIN — PROPOFOL 50 MG: 10 INJECTION, EMULSION INTRAVENOUS at 10:02

## 2024-07-25 RX ADMIN — LIDOCAINE HYDROCHLORIDE 50 MG: 20 INJECTION, SOLUTION EPIDURAL; INFILTRATION; INTRACAUDAL; PERINEURAL at 09:56

## 2024-07-25 NOTE — H&P
DAILY   estrogens conjugated (PREMARIN) 0.625 MG/GM CREA vaginal cream Past Month  Yes No   Si.5g PV daily for 2 weeks, then taper to 2-3 times per week.   meclizine (ANTIVERT) 25 MG tablet Past Month  Yes No   Sig: Take 1 tablet by mouth as needed   rosuvastatin (CRESTOR) 5 MG tablet Past Week  No No   Sig: TAKE ONE TABLET BY MOUTH EVERY OTHER DAY   vitamin D (CHOLECALCIFEROL) 25 MCG (1000 UT) TABS tablet Past Week  Yes No   Sig: Take 2 tablets by mouth daily   vitamin E 180 MG (400 UNIT) CAPS capsule Past Week  Yes No      Facility-Administered Medications: None       Hospital Medications:  Current Facility-Administered Medications   Medication Dose Route Frequency    0.9 % sodium chloride infusion   IntraVENous Continuous    sodium chloride flush 0.9 % injection 5-40 mL  5-40 mL IntraVENous 2 times per day    sodium chloride flush 0.9 % injection 5-40 mL  5-40 mL IntraVENous PRN    0.9 % sodium chloride infusion  25 mL IntraVENous PRN       Social History:  Social History     Tobacco Use    Smoking status: Never    Smokeless tobacco: Never   Substance Use Topics    Alcohol use: No       Family History:  Family History   Problem Relation Age of Onset    Anxiety Disorder Brother     Hypertension Brother     Cancer Father         prostate    Hypertension Mother     Coronary Art Dis Mother     Stroke Mother     Elevated Lipids Mother     Heart Disease Mother     Osteoarthritis Father              Review of Systems:      Constitutional: negative fever, negative chills, negative weight loss  Eyes:   negative visual changes  ENT:   negative sore throat, tongue or lip swelling  Respiratory:  negative cough, negative dyspnea  Cards:  negative for chest pain, palpitations, lower extremity edema  GI:   See HPI  :  negative for frequency, dysuria  Integument:  negative for rash and pruritus  Heme:  negative for easy bruising and gum/nose bleeding  Musculoskel: negative for myalgias,  back pain and muscle

## 2024-07-25 NOTE — PROGRESS NOTES
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
## 2024-07-25 NOTE — OP NOTE
[address] [address]        Colonoscopy Operative Report    Elvia Dickey  [id number]  1955      Procedure Type:   Colonoscopy --screening     Indications:    Screening colonoscopy         Pre-operative Diagnosis: see indication above    Post-operative Diagnosis:  See findings below    :  Uriah Mayfield MD    Staff: Circulator: Marietta Moctezuma RN; Georgette Hall RN     Referring Provider: Antonina Rogers MD      Sedation:  MAC      Procedure Details:  After informed consent was obtained with all risks and benefits of procedure explained and preoperative exam completed, the patient was taken to the endoscopy suite and placed in the left lateral decubitus position.  Upon sequential sedation as per above, a digital rectal exam was performed demonstrating internal hemorrhoids.  The Olympus pediatric videocolonoscope  was inserted in the rectum and carefully advanced to the terminal ileum.  The cecum was identified by the ileocecal valve and appendiceal orifice.  The quality of preparation was good.  The colonoscope was slowly withdrawn with careful evaluation between folds. Retroflexion in the rectum was completed .     Findings:   Normal colon and terminal ileum.      Specimen Removed:  none    Complications: None.     EBL:  None.    Impression:     As above    Recommendations:  Repeat colonoscopy in 10 years    Signed By: Uriah Mayfield MD     7/25/2024  10:20 AM

## 2024-07-25 NOTE — ANESTHESIA POSTPROCEDURE EVALUATION
Department of Anesthesiology  Postprocedure Note    Patient: Elvia Dickey  MRN: [id number]  YOB: 1955  Date of evaluation: 7/25/2024    Procedure Summary       Date: 07/25/24 Room / Location: [place] [address] / [facility] ENDOSCOPY    Anesthesia Start: 0947 Anesthesia Stop: 1010    Procedure: COLONOSCOPY (Lower GI Region) Diagnosis:       Personal history of colonic polyps      (Personal history of colonic polyps [Z86.010])    Surgeons: Uriah Mayfield MD Responsible Provider: Anderson Valerio MD    Anesthesia Type: MAC ASA Status: 2            Anesthesia Type: MAC    Neri Phase I: Neri Score: 10    Neri Phase II:      Anesthesia Post Evaluation    Patient location during evaluation: PACU  Patient participation: complete - patient participated  Level of consciousness: sleepy but conscious and responsive to verbal stimuli  Pain score: 0  Airway patency: patent  Nausea & Vomiting: no vomiting and no nausea  Cardiovascular status: blood pressure returned to baseline and hemodynamically stable  Respiratory status: acceptable  Hydration status: stable    No notable events documented.

## 2024-09-17 RX ORDER — CITALOPRAM HYDROBROMIDE 10 MG/1
10 TABLET ORAL DAILY
Qty: 90 TABLET | Refills: 0 | Status: SHIPPED | OUTPATIENT
Start: 2024-09-17

## 2024-10-27 NOTE — PROGRESS NOTES
Elvia Dickey is a 69 y.o. female Established patient who presents today for evaluation of the following -           Assessment & Plan  1. Depression.  Her mood is currently well-regulated with a daily dose of citalopram 10 mg. No adjustments to the current dosage are necessary at this time.    2. Hypertension.  Her hypertension is currently managed without medication. Blood pressure is low at 110/80 today.    3. Hyperlipidemia.  She is on a daily regimen of rosuvastatin 5 mg. A fasting lipid profile is required.    4. Meningioma.  An incidental finding was made in the emergency room in May 2023. A follow-up MRI conducted on 05/08/2024 showed stability.    5. Right tonsil lesion.  She has a benign pedunculated lesion measuring 7 mm, which is being managed by Dr. Austin Harding. She reports no dysphagia or discomfort. She has a follow-up with Dr. Harding in March 2025, at which time he will remove the lesion.    6. Health Maintenance.  She received a flu shot. She is not eligible for the RSV vaccine until she turns 77. She is advised to get the pneumonia vaccine.        Orders Placed This Encounter    CBC with Auto Differential     Standing Status:   Future     Number of Occurrences:   1     Standing Expiration Date:   10/29/2025    Comprehensive Metabolic Panel     Standing Status:   Future     Number of Occurrences:   1     Standing Expiration Date:   10/29/2025    Lipid Panel     Standing Status:   Future     Number of Occurrences:   1     Standing Expiration Date:   10/29/2025     Order Specific Question:   Is Patient Fasting?/# of Hours     Answer:   8     Order Specific Question:   Has the patient fasted?     Answer:   Yes    Urinalysis with Reflex to Culture     Standing Status:   Future     Number of Occurrences:   1     Standing Expiration Date:   10/29/2025     Order Specific Question:   SPECIFY(EX-CATH,MIDSTREAM,CYSTO,ETC)?     Answer:   midstream    NONFORMULARY     Sig: Oreganol one daily         Follow-up

## 2024-10-28 SDOH — ECONOMIC STABILITY: INCOME INSECURITY: HOW HARD IS IT FOR YOU TO PAY FOR THE VERY BASICS LIKE FOOD, HOUSING, MEDICAL CARE, AND HEATING?: PATIENT DECLINED

## 2024-10-28 SDOH — ECONOMIC STABILITY: TRANSPORTATION INSECURITY
IN THE PAST 12 MONTHS, HAS LACK OF TRANSPORTATION KEPT YOU FROM MEETINGS, WORK, OR FROM GETTING THINGS NEEDED FOR DAILY LIVING?: NO

## 2024-10-28 SDOH — ECONOMIC STABILITY: FOOD INSECURITY: WITHIN THE PAST 12 MONTHS, YOU WORRIED THAT YOUR FOOD WOULD RUN OUT BEFORE YOU GOT MONEY TO BUY MORE.: PATIENT DECLINED

## 2024-10-28 SDOH — ECONOMIC STABILITY: FOOD INSECURITY: WITHIN THE PAST 12 MONTHS, THE FOOD YOU BOUGHT JUST DIDN'T LAST AND YOU DIDN'T HAVE MONEY TO GET MORE.: PATIENT DECLINED

## 2024-10-29 ENCOUNTER — OFFICE VISIT (OUTPATIENT)
Age: 69
End: 2024-10-29
Payer: MEDICARE

## 2024-10-29 VITALS
RESPIRATION RATE: 14 BRPM | HEIGHT: 60 IN | OXYGEN SATURATION: 99 % | TEMPERATURE: 97.8 F | SYSTOLIC BLOOD PRESSURE: 110 MMHG | DIASTOLIC BLOOD PRESSURE: 80 MMHG | HEART RATE: 68 BPM | BODY MASS INDEX: 21.52 KG/M2 | WEIGHT: 109.6 LBS

## 2024-10-29 DIAGNOSIS — Z79.899 ENCOUNTER FOR LONG-TERM (CURRENT) USE OF MEDICATIONS: ICD-10-CM

## 2024-10-29 DIAGNOSIS — E78.2 MIXED HYPERLIPIDEMIA: ICD-10-CM

## 2024-10-29 DIAGNOSIS — I10 ESSENTIAL (PRIMARY) HYPERTENSION: Primary | ICD-10-CM

## 2024-10-29 DIAGNOSIS — I10 ESSENTIAL (PRIMARY) HYPERTENSION: ICD-10-CM

## 2024-10-29 DIAGNOSIS — J35.9 LESION OF TONSIL: ICD-10-CM

## 2024-10-29 DIAGNOSIS — F32.0 MAJOR DEPRESSIVE DISORDER, SINGLE EPISODE, MILD (HCC): ICD-10-CM

## 2024-10-29 LAB
ALBUMIN SERPL-MCNC: 4.4 G/DL (ref 3.5–5)
ALBUMIN/GLOB SERPL: 1.2 (ref 1.1–2.2)
ALP SERPL-CCNC: 58 U/L (ref 45–117)
ALT SERPL-CCNC: 31 U/L (ref 12–78)
ANION GAP SERPL CALC-SCNC: 5 MMOL/L (ref 2–12)
APPEARANCE UR: ABNORMAL
AST SERPL-CCNC: 26 U/L (ref 15–37)
BACTERIA URNS QL MICRO: ABNORMAL /HPF
BASOPHILS # BLD: 0.1 K/UL (ref 0–0.1)
BASOPHILS NFR BLD: 2 % (ref 0–1)
BILIRUB SERPL-MCNC: 0.5 MG/DL (ref 0.2–1)
BILIRUB UR QL CFM: NEGATIVE
BILIRUB UR QL: NEGATIVE
BUN SERPL-MCNC: 11 MG/DL (ref 6–20)
BUN/CREAT SERPL: 12 (ref 12–20)
CALCIUM SERPL-MCNC: 9.6 MG/DL (ref 8.5–10.1)
CHLORIDE SERPL-SCNC: 106 MMOL/L (ref 97–108)
CHOLEST SERPL-MCNC: 214 MG/DL
CO2 SERPL-SCNC: 30 MMOL/L (ref 21–32)
COLOR UR: ABNORMAL
CREAT SERPL-MCNC: 0.9 MG/DL (ref 0.55–1.02)
DIFFERENTIAL METHOD BLD: ABNORMAL
EOSINOPHIL # BLD: 0.2 K/UL (ref 0–0.4)
EOSINOPHIL NFR BLD: 3 % (ref 0–7)
EPITH CASTS URNS QL MICRO: ABNORMAL /LPF
ERYTHROCYTE [DISTWIDTH] IN BLOOD BY AUTOMATED COUNT: 13.2 % (ref 11.5–14.5)
GLOBULIN SER CALC-MCNC: 3.6 G/DL (ref 2–4)
GLUCOSE SERPL-MCNC: 97 MG/DL (ref 65–100)
GLUCOSE UR STRIP.AUTO-MCNC: NEGATIVE MG/DL
HCT VFR BLD AUTO: 35.4 % (ref 35–47)
HDLC SERPL-MCNC: 101 MG/DL
HDLC SERPL: 2.1 (ref 0–5)
HGB BLD-MCNC: 11.6 G/DL (ref 11.5–16)
HGB UR QL STRIP: NEGATIVE
IMM GRANULOCYTES # BLD AUTO: 0 K/UL (ref 0–0.04)
IMM GRANULOCYTES NFR BLD AUTO: 0 % (ref 0–0.5)
KETONES UR QL STRIP.AUTO: ABNORMAL MG/DL
LDLC SERPL CALC-MCNC: 95.6 MG/DL (ref 0–100)
LEUKOCYTE ESTERASE UR QL STRIP.AUTO: NEGATIVE
LYMPHOCYTES # BLD: 2.6 K/UL (ref 0.8–3.5)
LYMPHOCYTES NFR BLD: 53 % (ref 12–49)
MCH RBC QN AUTO: 30.5 PG (ref 26–34)
MCHC RBC AUTO-ENTMCNC: 32.8 G/DL (ref 30–36.5)
MCV RBC AUTO: 93.2 FL (ref 80–99)
MONOCYTES # BLD: 0.6 K/UL (ref 0–1)
MONOCYTES NFR BLD: 12 % (ref 5–13)
NEUTS SEG # BLD: 1.4 K/UL (ref 1.8–8)
NEUTS SEG NFR BLD: 30 % (ref 32–75)
NITRITE UR QL STRIP.AUTO: NEGATIVE
NRBC # BLD: 0 K/UL (ref 0–0.01)
NRBC BLD-RTO: 0 PER 100 WBC
PH UR STRIP: 5.5 (ref 5–8)
PLATELET # BLD AUTO: 236 K/UL (ref 150–400)
PMV BLD AUTO: 9.6 FL (ref 8.9–12.9)
POTASSIUM SERPL-SCNC: 3.8 MMOL/L (ref 3.5–5.1)
PROT SERPL-MCNC: 8 G/DL (ref 6.4–8.2)
PROT UR STRIP-MCNC: NEGATIVE MG/DL
RBC # BLD AUTO: 3.8 M/UL (ref 3.8–5.2)
RBC #/AREA URNS HPF: ABNORMAL /HPF (ref 0–5)
SODIUM SERPL-SCNC: 141 MMOL/L (ref 136–145)
SP GR UR REFRACTOMETRY: 1.02 (ref 1–1.03)
TRIGL SERPL-MCNC: 87 MG/DL
URINE CULTURE IF INDICATED: ABNORMAL
UROBILINOGEN UR QL STRIP.AUTO: 0.2 EU/DL (ref 0.2–1)
VLDLC SERPL CALC-MCNC: 17.4 MG/DL
WBC # BLD AUTO: 4.8 K/UL (ref 3.6–11)
WBC URNS QL MICRO: ABNORMAL /HPF (ref 0–4)

## 2024-10-29 PROCEDURE — 99214 OFFICE O/P EST MOD 30 MIN: CPT | Performed by: INTERNAL MEDICINE

## 2024-10-29 NOTE — PROGRESS NOTES
Chief Complaint   Patient presents with    Follow-up     eviewed record in preparation for visit and have obtained necessary documentation.    Identified pt with two pt identifiers(name and ).      Health Maintenance Due   Topic    Respiratory Syncytial Virus (RSV) Pregnant or age 60 yrs+ (1 - 1-dose 60+ series)    Pneumococcal 65+ years Vaccine (1 of 1 - PCV)    Flu vaccine (1)    COVID-19 Vaccine (3 - 2023-24 season)         Chief Complaint   Patient presents with    Follow-up        Wt Readings from Last 3 Encounters:   10/29/24 49.7 kg (109 lb 9.6 oz)   24 49.8 kg (109 lb 12.8 oz)   24 50.3 kg (111 lb)     Temp Readings from Last 3 Encounters:   10/29/24 97.8 °F (36.6 °C) (Temporal)   24 97.6 °F (36.4 °C) (Temporal)   24 98.4 °F (36.9 °C) (Temporal)     BP Readings from Last 3 Encounters:   10/29/24 110/80   24 115/75   24 110/84     Pulse Readings from Last 3 Encounters:   10/29/24 68   24 76   24 (!) 104           Learning Assessment:  :    Failed to redirect to the Timeline version of the [email or web address] SmartLink.    Depression Screening:  :         No data to display                Fall Risk Assessment:  :    Failed to redirect to the Timeline version of the [email or web address] SmartLink.    Abuse Screening:  :         No data to display

## 2024-12-17 ENCOUNTER — TELEPHONE (OUTPATIENT)
Age: 69
End: 2024-12-17

## 2024-12-17 RX ORDER — CITALOPRAM HYDROBROMIDE 10 MG/1
10 TABLET ORAL DAILY
Qty: 90 TABLET | Refills: 0 | Status: SHIPPED | OUTPATIENT
Start: 2024-12-17

## 2024-12-17 NOTE — TELEPHONE ENCOUNTER
Rx sent to pharmacy as previously filled and verified by Verbal Order Read Back with provider.    NV 05/01/2025

## 2024-12-17 NOTE — TELEPHONE ENCOUNTER
Medication Refill Request    Elvia Dickey is requesting a refill of the following medication(s):   Citalopram    Please send refill to:   [facility] [id number] - [place] - [address] [phone] - F [phone]  [address]  [facility] [address]  Phone: [phone] Fax: [phone]

## 2025-03-18 RX ORDER — CITALOPRAM HYDROBROMIDE 10 MG/1
10 TABLET ORAL DAILY
Qty: 90 TABLET | Refills: 0 | Status: SHIPPED | OUTPATIENT
Start: 2025-03-18

## 2025-04-10 ENCOUNTER — PATIENT MESSAGE (OUTPATIENT)
Age: 70
End: 2025-04-10

## 2025-04-10 DIAGNOSIS — E78.2 MIXED HYPERLIPIDEMIA: Primary | ICD-10-CM

## 2025-04-10 DIAGNOSIS — I10 ESSENTIAL (PRIMARY) HYPERTENSION: ICD-10-CM

## 2025-04-10 DIAGNOSIS — Z13.1 DIABETES MELLITUS SCREENING: ICD-10-CM

## 2025-04-21 DIAGNOSIS — Z13.1 DIABETES MELLITUS SCREENING: ICD-10-CM

## 2025-04-21 DIAGNOSIS — I10 ESSENTIAL (PRIMARY) HYPERTENSION: ICD-10-CM

## 2025-04-21 DIAGNOSIS — E78.2 MIXED HYPERLIPIDEMIA: ICD-10-CM

## 2025-04-21 LAB
ALBUMIN SERPL-MCNC: 4.3 G/DL (ref 3.5–5)
ALBUMIN/GLOB SERPL: 1.4 (ref 1.1–2.2)
ALP SERPL-CCNC: 57 U/L (ref 45–117)
ALT SERPL-CCNC: 31 U/L (ref 12–78)
ANION GAP SERPL CALC-SCNC: 3 MMOL/L (ref 2–12)
APPEARANCE UR: CLEAR
AST SERPL-CCNC: 25 U/L (ref 15–37)
BACTERIA URNS QL MICRO: NEGATIVE /HPF
BASOPHILS # BLD: 0.04 K/UL (ref 0–0.1)
BASOPHILS NFR BLD: 1.1 % (ref 0–1)
BILIRUB SERPL-MCNC: 0.5 MG/DL (ref 0.2–1)
BILIRUB UR QL: NEGATIVE
BUN SERPL-MCNC: 13 MG/DL (ref 6–20)
BUN/CREAT SERPL: 16 (ref 12–20)
CALCIUM SERPL-MCNC: 9.5 MG/DL (ref 8.5–10.1)
CHLORIDE SERPL-SCNC: 105 MMOL/L (ref 97–108)
CHOLEST SERPL-MCNC: 200 MG/DL
CO2 SERPL-SCNC: 31 MMOL/L (ref 21–32)
COLOR UR: ABNORMAL
CREAT SERPL-MCNC: 0.79 MG/DL (ref 0.55–1.02)
DIFFERENTIAL METHOD BLD: ABNORMAL
EOSINOPHIL # BLD: 0.1 K/UL (ref 0–0.4)
EOSINOPHIL NFR BLD: 2.7 % (ref 0–7)
EPITH CASTS URNS QL MICRO: ABNORMAL /LPF
ERYTHROCYTE [DISTWIDTH] IN BLOOD BY AUTOMATED COUNT: 12.8 % (ref 11.5–14.5)
EST. AVERAGE GLUCOSE BLD GHB EST-MCNC: 103 MG/DL
GLOBULIN SER CALC-MCNC: 3 G/DL (ref 2–4)
GLUCOSE SERPL-MCNC: 92 MG/DL (ref 65–100)
GLUCOSE UR STRIP.AUTO-MCNC: NEGATIVE MG/DL
HBA1C MFR BLD: 5.2 % (ref 4–5.6)
HCT VFR BLD AUTO: 33.8 % (ref 35–47)
HDLC SERPL-MCNC: 100 MG/DL
HDLC SERPL: 2 (ref 0–5)
HGB BLD-MCNC: 11.3 G/DL (ref 11.5–16)
HGB UR QL STRIP: NEGATIVE
HYALINE CASTS URNS QL MICRO: ABNORMAL /LPF (ref 0–5)
IMM GRANULOCYTES # BLD AUTO: 0 K/UL (ref 0–0.04)
IMM GRANULOCYTES NFR BLD AUTO: 0 % (ref 0–0.5)
KETONES UR QL STRIP.AUTO: ABNORMAL MG/DL
LDLC SERPL CALC-MCNC: 86.8 MG/DL (ref 0–100)
LEUKOCYTE ESTERASE UR QL STRIP.AUTO: NEGATIVE
LYMPHOCYTES # BLD: 1.84 K/UL (ref 0.8–3.5)
LYMPHOCYTES NFR BLD: 49.5 % (ref 12–49)
MCH RBC QN AUTO: 31 PG (ref 26–34)
MCHC RBC AUTO-ENTMCNC: 33.4 G/DL (ref 30–36.5)
MCV RBC AUTO: 92.6 FL (ref 80–99)
MONOCYTES # BLD: 0.41 K/UL (ref 0–1)
MONOCYTES NFR BLD: 11 % (ref 5–13)
NEUTS SEG # BLD: 1.33 K/UL (ref 1.8–8)
NEUTS SEG NFR BLD: 35.7 % (ref 32–75)
NITRITE UR QL STRIP.AUTO: NEGATIVE
NRBC # BLD: 0 K/UL (ref 0–0.01)
NRBC BLD-RTO: 0 PER 100 WBC
PH UR STRIP: 5.5 (ref 5–8)
PLATELET # BLD AUTO: 235 K/UL (ref 150–400)
PMV BLD AUTO: 9.6 FL (ref 8.9–12.9)
POTASSIUM SERPL-SCNC: 3.9 MMOL/L (ref 3.5–5.1)
PROT SERPL-MCNC: 7.3 G/DL (ref 6.4–8.2)
PROT UR STRIP-MCNC: NEGATIVE MG/DL
RBC # BLD AUTO: 3.65 M/UL (ref 3.8–5.2)
RBC #/AREA URNS HPF: ABNORMAL /HPF (ref 0–5)
SODIUM SERPL-SCNC: 139 MMOL/L (ref 136–145)
SP GR UR REFRACTOMETRY: 1.01 (ref 1–1.03)
TRIGL SERPL-MCNC: 66 MG/DL
URINE CULTURE IF INDICATED: ABNORMAL
UROBILINOGEN UR QL STRIP.AUTO: 0.2 EU/DL (ref 0.2–1)
VLDLC SERPL CALC-MCNC: 13.2 MG/DL
WBC # BLD AUTO: 3.7 K/UL (ref 3.6–11)
WBC URNS QL MICRO: ABNORMAL /HPF (ref 0–4)

## 2025-04-22 ENCOUNTER — RESULTS FOLLOW-UP (OUTPATIENT)
Age: 70
End: 2025-04-22

## 2025-04-27 SDOH — HEALTH STABILITY: PHYSICAL HEALTH: ON AVERAGE, HOW MANY DAYS PER WEEK DO YOU ENGAGE IN MODERATE TO STRENUOUS EXERCISE (LIKE A BRISK WALK)?: 3 DAYS

## 2025-04-27 SDOH — HEALTH STABILITY: PHYSICAL HEALTH: ON AVERAGE, HOW MANY MINUTES DO YOU ENGAGE IN EXERCISE AT THIS LEVEL?: 30 MIN

## 2025-04-27 ASSESSMENT — LIFESTYLE VARIABLES
HOW MANY STANDARD DRINKS CONTAINING ALCOHOL DO YOU HAVE ON A TYPICAL DAY: 0
HOW OFTEN DO YOU HAVE A DRINK CONTAINING ALCOHOL: 1
HOW OFTEN DO YOU HAVE SIX OR MORE DRINKS ON ONE OCCASION: 1
HOW OFTEN DO YOU HAVE A DRINK CONTAINING ALCOHOL: NEVER
HOW MANY STANDARD DRINKS CONTAINING ALCOHOL DO YOU HAVE ON A TYPICAL DAY: PATIENT DOES NOT DRINK

## 2025-04-27 ASSESSMENT — PATIENT HEALTH QUESTIONNAIRE - PHQ9
2. FEELING DOWN, DEPRESSED OR HOPELESS: NOT AT ALL
3. TROUBLE FALLING OR STAYING ASLEEP: NOT AT ALL
9. THOUGHTS THAT YOU WOULD BE BETTER OFF DEAD, OR OF HURTING YOURSELF: NOT AT ALL
SUM OF ALL RESPONSES TO PHQ QUESTIONS 1-9: 0
6. FEELING BAD ABOUT YOURSELF - OR THAT YOU ARE A FAILURE OR HAVE LET YOURSELF OR YOUR FAMILY DOWN: NOT AT ALL
5. POOR APPETITE OR OVEREATING: NOT AT ALL
7. TROUBLE CONCENTRATING ON THINGS, SUCH AS READING THE NEWSPAPER OR WATCHING TELEVISION: NOT AT ALL
4. FEELING TIRED OR HAVING LITTLE ENERGY: NOT AT ALL
SUM OF ALL RESPONSES TO PHQ QUESTIONS 1-9: 0
SUM OF ALL RESPONSES TO PHQ QUESTIONS 1-9: 0
10. IF YOU CHECKED OFF ANY PROBLEMS, HOW DIFFICULT HAVE THESE PROBLEMS MADE IT FOR YOU TO DO YOUR WORK, TAKE CARE OF THINGS AT HOME, OR GET ALONG WITH OTHER PEOPLE: NOT DIFFICULT AT ALL
SUM OF ALL RESPONSES TO PHQ QUESTIONS 1-9: 0
1. LITTLE INTEREST OR PLEASURE IN DOING THINGS: NOT AT ALL
8. MOVING OR SPEAKING SO SLOWLY THAT OTHER PEOPLE COULD HAVE NOTICED. OR THE OPPOSITE, BEING SO FIGETY OR RESTLESS THAT YOU HAVE BEEN MOVING AROUND A LOT MORE THAN USUAL: NOT AT ALL

## 2025-04-30 NOTE — PROGRESS NOTES
Medicare Annual Wellness Visit    Elvia Dickey is here for Medicare AWV    Assessment & Plan  1. Medicare wellness exam.  - Established ACP, record requested, healthcare decision maker updated.  - Current with breast and colon cancer screenings. Declined COVID-19 vaccines.  - Due for tetanus and pneumonia vaccines, prescriptions provided.  - Up-to-date with shingles vaccine.  -labs done 4/21/2025 reviewed with patient.     Also, she has additional complaints of the following -.     2. Depression with anxiety: Stable.  - Mood well-regulated with citalopram.  - Continued citalopram.    3. Hyperlipidemia: Stable.  - LDL controlled per 04/20/2025 labs.  - Continued [id number] daily    4. Right tonsillar mass.  - Biopsy scheduled for 07/20/2025 with Dr. Austin Harding.  - Referral to Dr. Austin Harding.    5. Torn earlobe, left ear.  - Correction planned during biopsy.  - Referral for simultaneous correction.    6. Osteopenia  -recommend surveillance DXA at this time.   -encouraged weight bearing exercises.    Follow-up in 6 months.    Orders Placed This Encounter    DEXA BONE DENSITY AXIAL SKELETON     Standing Status:   Future     Expected Date:   5/1/2025     Expiration Date:   5/1/2026    pneumococcal 20-valent conjugat (PREVNAR) 0.5 ML LAYA inj     Sig: Inject 0.5 mLs into the muscle once for 1 dose     Dispense:  1 each     Refill:  0    Tdap (ADACEL) 5-2-15.5 LF-MCG/0.5 injection     Sig: Inject 0.5 mLs into the muscle once for 1 dose     Dispense:  0.5 mL     Refill:  0           Return in about 6 months (around 11/1/2025) for follow up.     Subjective     History of Present Illness  The patient came in for a Medicare wellness exam.     . Her physical activity is mostly walking, and she's drinking more water than before, but still not as much as recommended. She got her flu shot at [facility] in October 2024 and needs to get her pneumonia vaccine and a tetanus booster since the last one was 10 years ago. She

## 2025-05-01 ENCOUNTER — OFFICE VISIT (OUTPATIENT)
Age: 70
End: 2025-05-01
Payer: MEDICARE

## 2025-05-01 VITALS
RESPIRATION RATE: 14 BRPM | SYSTOLIC BLOOD PRESSURE: 100 MMHG | BODY MASS INDEX: 21.93 KG/M2 | TEMPERATURE: 97.2 F | DIASTOLIC BLOOD PRESSURE: 60 MMHG | HEIGHT: 59 IN | OXYGEN SATURATION: 98 % | HEART RATE: 80 BPM | WEIGHT: 108.8 LBS

## 2025-05-01 DIAGNOSIS — Z79.899 ENCOUNTER FOR LONG-TERM (CURRENT) USE OF MEDICATIONS: ICD-10-CM

## 2025-05-01 DIAGNOSIS — Z00.00 MEDICARE ANNUAL WELLNESS VISIT, SUBSEQUENT: Primary | ICD-10-CM

## 2025-05-01 DIAGNOSIS — F32.0 MAJOR DEPRESSIVE DISORDER, SINGLE EPISODE, MILD: ICD-10-CM

## 2025-05-01 DIAGNOSIS — M85.80 OSTEOPENIA, UNSPECIFIED LOCATION: ICD-10-CM

## 2025-05-01 DIAGNOSIS — Z78.0 MENOPAUSE: ICD-10-CM

## 2025-05-01 DIAGNOSIS — E78.2 MIXED HYPERLIPIDEMIA: ICD-10-CM

## 2025-05-01 PROCEDURE — 1090F PRES/ABSN URINE INCON ASSESS: CPT | Performed by: INTERNAL MEDICINE

## 2025-05-01 PROCEDURE — 1160F RVW MEDS BY RX/DR IN RCRD: CPT | Performed by: INTERNAL MEDICINE

## 2025-05-01 PROCEDURE — 1123F ACP DISCUSS/DSCN MKR DOCD: CPT | Performed by: INTERNAL MEDICINE

## 2025-05-01 PROCEDURE — G8399 PT W/DXA RESULTS DOCUMENT: HCPCS | Performed by: INTERNAL MEDICINE

## 2025-05-01 PROCEDURE — 3017F COLORECTAL CA SCREEN DOC REV: CPT | Performed by: INTERNAL MEDICINE

## 2025-05-01 PROCEDURE — 1126F AMNT PAIN NOTED NONE PRSNT: CPT | Performed by: INTERNAL MEDICINE

## 2025-05-01 PROCEDURE — 3074F SYST BP LT 130 MM HG: CPT | Performed by: INTERNAL MEDICINE

## 2025-05-01 PROCEDURE — 1159F MED LIST DOCD IN RCRD: CPT | Performed by: INTERNAL MEDICINE

## 2025-05-01 PROCEDURE — 1036F TOBACCO NON-USER: CPT | Performed by: INTERNAL MEDICINE

## 2025-05-01 PROCEDURE — G0439 PPPS, SUBSEQ VISIT: HCPCS | Performed by: INTERNAL MEDICINE

## 2025-05-01 PROCEDURE — 99214 OFFICE O/P EST MOD 30 MIN: CPT | Performed by: INTERNAL MEDICINE

## 2025-05-01 PROCEDURE — 3078F DIAST BP <80 MM HG: CPT | Performed by: INTERNAL MEDICINE

## 2025-05-01 PROCEDURE — G8427 DOCREV CUR MEDS BY ELIG CLIN: HCPCS | Performed by: INTERNAL MEDICINE

## 2025-05-01 PROCEDURE — G8420 CALC BMI NORM PARAMETERS: HCPCS | Performed by: INTERNAL MEDICINE

## 2025-05-01 SDOH — ECONOMIC STABILITY: FOOD INSECURITY: WITHIN THE PAST 12 MONTHS, THE FOOD YOU BOUGHT JUST DIDN'T LAST AND YOU DIDN'T HAVE MONEY TO GET MORE.: NEVER TRUE

## 2025-05-01 SDOH — ECONOMIC STABILITY: FOOD INSECURITY: WITHIN THE PAST 12 MONTHS, YOU WORRIED THAT YOUR FOOD WOULD RUN OUT BEFORE YOU GOT MONEY TO BUY MORE.: NEVER TRUE

## 2025-05-01 NOTE — PROGRESS NOTES
Chief Complaint   Patient presents with    Medicare AWV     eviewed record in preparation for visit and have obtained necessary documentation.    Identified pt with two pt identifiers(name and ).      Health Maintenance Due   Topic    Pneumococcal 50+ years Vaccine (1 of 1 - PCV)    COVID-19 Vaccine (3 - 2024-25 season)    Annual Wellness Visit (Medicare)          Chief Complaint   Patient presents with    Medicare AWV        Wt Readings from Last 3 Encounters:   25 49.4 kg (108 lb 12.8 oz)   10/29/24 49.7 kg (109 lb 9.6 oz)   24 49.8 kg (109 lb 12.8 oz)     Temp Readings from Last 3 Encounters:   25 97.2 °F (36.2 °C) (Temporal)   10/29/24 97.8 °F (36.6 °C) (Temporal)   24 97.6 °F (36.4 °C) (Temporal)     BP Readings from Last 3 Encounters:   25 100/60   10/29/24 110/80   24 115/75     Pulse Readings from Last 3 Encounters:   25 80   10/29/24 68   24 76           Learning Assessment:  :    Failed to redirect to the Timeline version of the [employer] SmartLink.    Depression Screening:  :         No data to display                Fall Risk Assessment:  :    Failed to redirect to the Timeline version of the REVFS SmartLink.    Abuse Screening:  :         No data to display

## 2025-05-01 NOTE — PATIENT INSTRUCTIONS
attack. These may include:    Chest pain or pressure, or a strange feeling in the chest.     Sweating.     Shortness of breath.     Pain, pressure, or a strange feeling in the back, neck, jaw, or upper belly or in one or both shoulders or arms.     Lightheadedness or sudden weakness.     A fast or irregular heartbeat.   After you call 911, the  may tell you to chew 1 adult-strength or 2 to 4 low-dose aspirin. Wait for an ambulance. Do not try to drive yourself.  Watch closely for changes in your health, and be sure to contact your doctor if you have any problems.  Where can you learn more?  Go to https://www.[employer].net/patientEd and enter F075 to learn more about \"A Healthy Heart: Care Instructions.\"  Current as of: July 31, 2024  Content Version: 14.4  © 0416-7493 [employer].   Care instructions adapted under license by [employer]. If you have questions about a medical condition or this instruction, always ask your healthcare professional. [employer], disclaims any warranty or liability for your use of this information.    Personalized Preventive Plan for Elvia Dickey - 5/1/2025  Medicare offers a range of preventive health benefits. Some of the tests and screenings are paid in full while other may be subject to a deductible, co-insurance, and/or copay.  Some of these benefits include a comprehensive review of your medical history including lifestyle, illnesses that may run in your family, and various assessments and screenings as appropriate.  After reviewing your medical record and screening and assessments performed today your provider may have ordered immunizations, labs, imaging, and/or referrals for you.  A list of these orders (if applicable) as well as your Preventive Care list are included within your After Visit Summary for your review.

## 2025-06-05 ENCOUNTER — TRANSCRIBE ORDERS (OUTPATIENT)
Facility: HOSPITAL | Age: 70
End: 2025-06-05

## 2025-06-05 DIAGNOSIS — Z12.31 ENCOUNTER FOR SCREENING MAMMOGRAM FOR MALIGNANT NEOPLASM OF BREAST: Primary | ICD-10-CM

## 2025-06-17 ENCOUNTER — HOSPITAL ENCOUNTER (OUTPATIENT)
Age: 70
Discharge: HOME | End: 2025-06-20
Payer: MEDICARE

## 2025-06-17 VITALS — HEIGHT: 60 IN | BODY MASS INDEX: 21.79 KG/M2 | WEIGHT: 111 LBS

## 2025-06-17 DIAGNOSIS — Z12.31 ENCOUNTER FOR SCREENING MAMMOGRAM FOR MALIGNANT NEOPLASM OF BREAST: ICD-10-CM

## 2025-06-17 DIAGNOSIS — Z78.0 MENOPAUSE: ICD-10-CM

## 2025-06-17 DIAGNOSIS — M85.80 OSTEOPENIA, UNSPECIFIED LOCATION: ICD-10-CM

## 2025-06-17 PROCEDURE — 77080 DXA BONE DENSITY AXIAL: CPT

## 2025-06-17 PROCEDURE — 77063 BREAST TOMOSYNTHESIS BI: CPT

## 2025-06-18 RX ORDER — CITALOPRAM HYDROBROMIDE 10 MG/1
10 TABLET ORAL DAILY
Qty: 90 TABLET | Refills: 1 | Status: SHIPPED | OUTPATIENT
Start: 2025-06-18

## 2025-06-18 NOTE — TELEPHONE ENCOUNTER
Rx sent to pharmacy as previously filled and verified by Verbal Order Read Back with provider.    NV 11/03/2025

## 2025-06-22 ENCOUNTER — RESULTS FOLLOW-UP (OUTPATIENT)
Age: 70
End: 2025-06-22

## 2025-06-22 DIAGNOSIS — Z92.89 H/O BONE DENSITY STUDY: Primary | Chronic | ICD-10-CM

## 2025-07-03 RX ORDER — ROSUVASTATIN CALCIUM 5 MG/1
5 TABLET, COATED ORAL EVERY OTHER DAY
Qty: 90 TABLET | Refills: 1 | Status: SHIPPED | OUTPATIENT
Start: 2025-07-03

## (undated) DEVICE — SUPPLEMENT DIGESTIVE H2O SOL GI-EASE